# Patient Record
Sex: FEMALE | Race: BLACK OR AFRICAN AMERICAN | NOT HISPANIC OR LATINO | Employment: FULL TIME | URBAN - METROPOLITAN AREA
[De-identification: names, ages, dates, MRNs, and addresses within clinical notes are randomized per-mention and may not be internally consistent; named-entity substitution may affect disease eponyms.]

---

## 2017-04-30 ENCOUNTER — HOSPITAL ENCOUNTER (EMERGENCY)
Facility: HOSPITAL | Age: 47
Discharge: LEFT BEFORE DISCHARGE | End: 2017-04-30
Attending: EMERGENCY MEDICINE
Payer: MEDICAID

## 2017-04-30 VITALS
HEART RATE: 50 BPM | TEMPERATURE: 99 F | SYSTOLIC BLOOD PRESSURE: 164 MMHG | HEIGHT: 65 IN | RESPIRATION RATE: 16 BRPM | BODY MASS INDEX: 37.82 KG/M2 | OXYGEN SATURATION: 99 % | WEIGHT: 227 LBS | DIASTOLIC BLOOD PRESSURE: 75 MMHG

## 2017-04-30 DIAGNOSIS — E03.9 HYPOTHYROIDISM, UNSPECIFIED TYPE: Primary | ICD-10-CM

## 2017-04-30 LAB
ALBUMIN SERPL BCP-MCNC: 3.8 G/DL
ALP SERPL-CCNC: 63 U/L
ALT SERPL W/O P-5'-P-CCNC: 11 U/L
ANION GAP SERPL CALC-SCNC: 8 MMOL/L
ANION GAP SERPL CALC-SCNC: 9 MMOL/L
AST SERPL-CCNC: 14 U/L
BASOPHILS # BLD AUTO: 0 K/UL
BASOPHILS NFR BLD: 0.3 %
BILIRUB SERPL-MCNC: 0.4 MG/DL
BUN SERPL-MCNC: 12 MG/DL
BUN SERPL-MCNC: 13 MG/DL
CALCIUM SERPL-MCNC: 9.4 MG/DL
CALCIUM SERPL-MCNC: 9.6 MG/DL
CHLORIDE SERPL-SCNC: 101 MMOL/L
CHLORIDE SERPL-SCNC: 104 MMOL/L
CO2 SERPL-SCNC: 26 MMOL/L
CO2 SERPL-SCNC: 26 MMOL/L
CREAT SERPL-MCNC: 0.9 MG/DL
CREAT SERPL-MCNC: 0.9 MG/DL
DIFFERENTIAL METHOD: ABNORMAL
EOSINOPHIL # BLD AUTO: 0.1 K/UL
EOSINOPHIL NFR BLD: 0.9 %
ERYTHROCYTE [DISTWIDTH] IN BLOOD BY AUTOMATED COUNT: 16.5 %
EST. GFR  (AFRICAN AMERICAN): >60 ML/MIN/1.73 M^2
EST. GFR  (AFRICAN AMERICAN): >60 ML/MIN/1.73 M^2
EST. GFR  (NON AFRICAN AMERICAN): >60 ML/MIN/1.73 M^2
EST. GFR  (NON AFRICAN AMERICAN): >60 ML/MIN/1.73 M^2
GLUCOSE SERPL-MCNC: 79 MG/DL
GLUCOSE SERPL-MCNC: 87 MG/DL
HCT VFR BLD AUTO: 39.5 %
HGB BLD-MCNC: 12.6 G/DL
LYMPHOCYTES # BLD AUTO: 1.5 K/UL
LYMPHOCYTES NFR BLD: 23.3 %
MCH RBC QN AUTO: 26.9 PG
MCHC RBC AUTO-ENTMCNC: 32 %
MCV RBC AUTO: 84 FL
MONOCYTES # BLD AUTO: 0.4 K/UL
MONOCYTES NFR BLD: 7.1 %
NEUTROPHILS # BLD AUTO: 4.3 K/UL
NEUTROPHILS NFR BLD: 68.4 %
PLATELET # BLD AUTO: 197 K/UL
PMV BLD AUTO: 9.4 FL
POTASSIUM SERPL-SCNC: 5.1 MMOL/L
POTASSIUM SERPL-SCNC: 5.6 MMOL/L
PROT SERPL-MCNC: 8.4 G/DL
RBC # BLD AUTO: 4.7 M/UL
SODIUM SERPL-SCNC: 135 MMOL/L
SODIUM SERPL-SCNC: 139 MMOL/L
T4 FREE SERPL-MCNC: <0.4 NG/DL
TSH SERPL DL<=0.005 MIU/L-ACNC: 47.32 UIU/ML
WBC # BLD AUTO: 6.3 K/UL

## 2017-04-30 PROCEDURE — 85025 COMPLETE CBC W/AUTO DIFF WBC: CPT

## 2017-04-30 PROCEDURE — 84439 ASSAY OF FREE THYROXINE: CPT

## 2017-04-30 PROCEDURE — 99283 EMERGENCY DEPT VISIT LOW MDM: CPT

## 2017-04-30 PROCEDURE — 80053 COMPREHEN METABOLIC PANEL: CPT

## 2017-04-30 PROCEDURE — 36415 COLL VENOUS BLD VENIPUNCTURE: CPT

## 2017-04-30 PROCEDURE — 84443 ASSAY THYROID STIM HORMONE: CPT

## 2017-04-30 PROCEDURE — 80048 BASIC METABOLIC PNL TOTAL CA: CPT

## 2017-04-30 RX ORDER — LEVOTHYROXINE SODIUM 125 UG/1
125 TABLET ORAL DAILY
COMMUNITY
End: 2017-04-30

## 2017-04-30 RX ORDER — LEVOTHYROXINE SODIUM 125 UG/1
125 TABLET ORAL DAILY
Qty: 30 TABLET | Refills: 0 | Status: SHIPPED | OUTPATIENT
Start: 2017-04-30 | End: 2020-08-06 | Stop reason: CLARIF

## 2017-04-30 NOTE — DISCHARGE INSTRUCTIONS
Evaluating Thyroid Problems     Fine needle aspiration.   Your doctor suspects that you have a thyroid problem. Thyroid problems can be fairly easy to diagnose. Your doctor is likely to take a medical history, do a physical exam, and order blood tests. You may also have further testing. Based on the results, your doctor may refer you to an endocrinologist (thyroid specialist) or a surgeon.  Medical history  Your doctor will ask about symptoms youve noticed, such as changes in body temperature, weight, and energy level. Tell your doctor about all medications youre taking and if youve ever had thyroid surgery. Be sure to mention if you have a family history of thyroid problems, or if you are pregnant or plan to become pregnant. Also tell your doctor if youve ever been treated with radiation to the head or neck.  Physical exam  After the medical history, your doctor will examine you. He or she will feel your neck to check your thyroid gland for changes in size or shape. Your doctor may also look for changes in heart rate, reflexes, muscle strength, or skin texture.  Blood tests  Your doctor will order blood tests. They may include the following:  · A TSH test helps determine how much thyroid stimulating hormone (TSH) is being produced by the pituitary gland. This test is used to help diagnose or evaluate most thyroid problems, by determining whether a person's thyroid hormone is normal, overactive (hyperthyroidism), or underactive (hypothyroidism).  · T3 and T4 tests help determine how much thyroid hormones (T3 and T4) are available in the blood. This test if often used to help diagnose hyperthyroidism or hypothyroidism.  Other diagnostic tests  Based on the results of your exam and blood tests, you may have other diagnostic tests. They may include the following:  · Thyroid antibody tests are blood tests that look for problems with the immune system. These tests are most often used if hypothyroidism or  hyperthyroidism is suspected.  · An ultrasound uses sound waves to create an image showing the size and shape of the thyroid gland. It is most often used if a nodule (a lump in the thyroid) or goiter (enlarged thyroid) is suspected.  · A radioactive iodine uptake test measures how much iodine the thyroid gland takes in. It is most often used if hyperthyroidism is suspected.  · A thyroid scan is an imaging test that can show if part of the thyroid gland is making too much thyroid hormone. It is most often used if hyperthyroidism is suspected.  Fine needle aspiration (FNA)  If you have a nodule, you may have a fine needle aspiration done. This is a biopsy, which is a procedure to remove a sample of cells. An FNA is the best test to find out if thyroid cells are cancerous. The doctor uses a needle to take cells from the thyroid. The cells are then analyzed under a microscope. If cancer is suspected, other tests may also be done to help determine the type of cancer.  Risks and complications of FNA are rare, but include mild discomfort, bleeding, and skin infection.  Date Last Reviewed: 8/13/2014 © 2000-2016 [employer]. [address] [place] [address]. All rights reserved. This information is not intended as a substitute for professional medical care. Always follow your healthcare professional's instructions.          Treating Thyroid Problems    Your doctor has diagnosed a thyroid problem and will work with you to create a treatment plan. Even if you dont have symptoms, getting proper care is important. Following are the most common types of thyroid disorders and their treatments.  Hypothyroidism  Hypothyroidism is an underactive thyroid. There is no cure for this condition. But treatment can relieve most or all of your symptoms caused by the low thyroid hormone levels. Treatment for hypothyroidism involves taking thyroid hormone pills daily.  · Thyroid hormone pills replace the hormone your  thyroid doesnt make. You will likely need to take a daily pill for the rest of your life. Over time, your dosage may be adjusted. The medication has minimal side effects if the dosage is correct. However, if the dosage is too high, you may have symptoms of an overactive thyroid (such as nervousness, irritability, heart racing, tremors, difficulty sleeping, or brittle hair). If the dosage is too low, you may have symptoms of an underactive thyroid (such as dry skin, fatigue, loss of energy, or memory problems). Be sure to tell your health care provider if you notice any symptoms of thyroid problems.  · Your doctor will adjust your dose to achieve the right hormone levels. To be sure the thyroid hormone works properly, take the thyroid hormone pill on an empty stomach without other medications.  Hyperthyroidism  Hyperthyroidism is an overactive thyroid. The main treatments for hyperthyroidism may be used alone or in combination with beta-blockers (drugs that can reduce symptoms caused by too much thyroid hormone).  · Anti-thyroid medication can reduce the amount of thyroid hormone made by your thyroid gland. Reactions from this medication are rare, but may include a dangerous decline in white blood cells, and liver damage. Talk with your doctor for more information. If you have a fever or sore throat while taking this medicine, talk with your doctor right away.  · Radioactive iodine ablation is the most common treatment for hyperthyroidism. It involves taking a pill or liquid dose of radioactive iodine. This treatment destroys the thyroid cells that are making too much hormone. Radioiodine ablation may result in the need for daily thyroid hormone pills.  · Surgery can be an effective treatment for hyperthyroidism. It involves removing part or all of your thyroid gland. After surgery, you may need to take daily thyroid hormone pills.  In some instances, excessive thyroid hormone due to certain conditions can be  controlled by beta-blockers alone.  Nodules  If you have benign nodules, you may not need treatment right away. Instead, your health care provider may suggest regular exams and ultrasound tests to see if the nodules grow. If treatment is needed, it may include:  · Surgery to treat cancerous nodules or benign nodules that are causing symptoms (such as choking, or difficulty swallowing). Surgery involves removing part or all of your thyroid gland. Sometimes surgery to remove cancerous nodules may be followed by radioactive iodine therapy (radioiodine ablation). Afterward, you may need to take daily thyroid hormone pills.  · If a benign thyroid nodule is causing an overactive thyroid, your health care provider may treat it with anti-thyroid medications.  If these treatments do not work, then radioiodine ablation or surgery may be needed.  Radioiodine Ablation: Things to Know  This is a very safe treatment. Your health care provider will talk with you about any risks and possible complications. You will likely receive the iodine at the hospital and go home the same day. The risk from the radiation to yourself and others is very small. However, you may need to stay away from other people for several days. It is most important to avoid children and pregnant women during this time.   Date Last Reviewed: 8/13/2014  © 7780-2743 [employer]. [address] [place] [address], [place] [address]. All rights reserved. This information is not intended as a substitute for professional medical care. Always follow your healthcare professional's instructions.

## 2017-04-30 NOTE — Clinical Note
"Date: 4/30/2017  Patient: Serene Davis  Admitted: 4/30/2017  8:33 AM  Attending Provider: Sourav Baker MD    Serene Davis or her authorized caregiver has made the decision for the patient to leave the emergency department against the  advice of her attending physician. She or her authorized caregiver has been informed and understands the inherent risks, including death, disability, deterioration.  She or her authorized caregiver has decided to accept the responsibility for this decisi on. Serene Davis and all necessary parties have been advised that she may return for further evaluation or treatment. Her condition at time of discharge was fair.  Serene Davis had current vital signs as follows:  /85 (BP Location : Right arm, Patient Position: Sitting)  Pulse 64  Temp 98.7 °F (37.1 °C) (Oral)  Resp 16  Ht 5' 5" (1.651 m)  Wt 103 kg (227 lb)  SpO2 100%  BMI 37.77 kg/m2  "

## 2017-04-30 NOTE — ED AVS SNAPSHOT
[facility] [address] [place] [address]               Serene Davis   2017  8:33 AM   ED    Description:  Female : 1970   Department:  [facility]           Your Care was Coordinated By:     Provider Role From To    Sourav Baker MD Attending Provider 17 [phone] --      Reason for Visit     Medication Refill           Diagnoses this Visit        Comments    Hypothyroidism, unspecified type    -  Primary       ED Disposition     None           To Do List           Follow-up Information     Follow up with Laura Bell MD. Schedule an appointment as soon as possible for a visit in 2 days.    Specialty:  Family Medicine    Contact information:    [address]  [facility] [address]  [phone]          Follow up with [facility].    Specialty:  Emergency Medicine    Why:  If symptoms worsen    Contact information:    [address] [place] [id number] [phone]       These Medications        Disp Refills Start End    levothyroxine (SYNTHROID) 125 MCG tablet 30 tablet 0 2017     Take 1 tablet (125 mcg total) by mouth once daily. - Oral      [facility]     [facility] On Call Nurse Care Line -  Assistance  Unless otherwise directed by your provider, please contact [facility], our nurse care line that is available for  assistance.     Registered nurses in the [facility] provide: appointment scheduling, clinical advisement, health education, and other advisory services.  Call: [phone] (toll free)               Medications           Message regarding Medications     Verify the changes and/or additions to your medication regime listed below are the same as discussed with your clinician today.  If any of these changes or additions are incorrect, please notify your healthcare provider.        START taking these NEW medications        Refills     "levothyroxine (SYNTHROID) 125 MCG tablet 0    Sig: Take 1 tablet (125 mcg total) by mouth once daily.    Class: Print    Route: Oral           Verify that the below list of medications is an accurate representation of the medications you are currently taking.  If none reported, the list may be blank. If incorrect, please contact your healthcare provider. Carry this list with you in case of emergency.           Current Medications     levothyroxine (SYNTHROID) 125 MCG tablet Take 1 tablet (125 mcg total) by mouth once daily.           Clinical Reference Information           Your Vitals Were     BP Pulse Temp Resp Height Weight    138/85 (BP Location: Right arm, Patient Position: Sitting) 64 98.7 °F (37.1 °C) (Oral) 16 5' 5" (1.651 m) 103 kg (227 lb)    SpO2 BMI             100% 37.77 kg/m2         Allergies as of 4/30/2017        Reactions    Adhesive       Immunizations Administered on Date of Encounter - 4/30/2017     None      ED Micro, Lab, POCT     Start Ordered       Status Ordering Provider    04/30/17 1037 04/30/17 1037  Basic metabolic panel  Once      In process     04/30/17 1024 04/30/17 1023    STAT,   Status:  Canceled      Canceled     04/30/17 0917 04/30/17 0916  TSH  STAT      Final result     04/30/17 0917 04/30/17 0916  Complete Blood Count (CBC)  STAT      Final result     04/30/17 0917 04/30/17 0916  Comprehensive Metabolic Panel (CMP)  STAT      Final result     04/30/17 0916 04/30/17 0916  T4, free  Once      In process       ED Imaging Orders     None        Discharge Instructions         Evaluating Thyroid Problems     Fine needle aspiration.   Your doctor suspects that you have a thyroid problem. Thyroid problems can be fairly easy to diagnose. Your doctor is likely to take a medical history, do a physical exam, and order blood tests. You may also have further testing. Based on the results, your doctor may refer you to an endocrinologist (thyroid specialist) or a surgeon.  Medical " history  Your doctor will ask about symptoms youve noticed, such as changes in body temperature, weight, and energy level. Tell your doctor about all medications youre taking and if youve ever had thyroid surgery. Be sure to mention if you have a family history of thyroid problems, or if you are pregnant or plan to become pregnant. Also tell your doctor if youve ever been treated with radiation to the head or neck.  Physical exam  After the medical history, your doctor will examine you. He or she will feel your neck to check your thyroid gland for changes in size or shape. Your doctor may also look for changes in heart rate, reflexes, muscle strength, or skin texture.  Blood tests  Your doctor will order blood tests. They may include the following:  · A TSH test helps determine how much thyroid stimulating hormone (TSH) is being produced by the pituitary gland. This test is used to help diagnose or evaluate most thyroid problems, by determining whether a person's thyroid hormone is normal, overactive (hyperthyroidism), or underactive (hypothyroidism).  · T3 and T4 tests help determine how much thyroid hormones (T3 and T4) are available in the blood. This test if often used to help diagnose hyperthyroidism or hypothyroidism.  Other diagnostic tests  Based on the results of your exam and blood tests, you may have other diagnostic tests. They may include the following:  · Thyroid antibody tests are blood tests that look for problems with the immune system. These tests are most often used if hypothyroidism or hyperthyroidism is suspected.  · An ultrasound uses sound waves to create an image showing the size and shape of the thyroid gland. It is most often used if a nodule (a lump in the thyroid) or goiter (enlarged thyroid) is suspected.  · A radioactive iodine uptake test measures how much iodine the thyroid gland takes in. It is most often used if hyperthyroidism is suspected.  · A thyroid scan is an imaging test  that can show if part of the thyroid gland is making too much thyroid hormone. It is most often used if hyperthyroidism is suspected.  Fine needle aspiration (FNA)  If you have a nodule, you may have a fine needle aspiration done. This is a biopsy, which is a procedure to remove a sample of cells. An FNA is the best test to find out if thyroid cells are cancerous. The doctor uses a needle to take cells from the thyroid. The cells are then analyzed under a microscope. If cancer is suspected, other tests may also be done to help determine the type of cancer.  Risks and complications of FNA are rare, but include mild discomfort, bleeding, and skin infection.  Date Last Reviewed: 8/13/2014  © 7504-9477 [employer]. [address] [place] [address], [place] [address]. All rights reserved. This information is not intended as a substitute for professional medical care. Always follow your healthcare professional's instructions.          Treating Thyroid Problems    Your doctor has diagnosed a thyroid problem and will work with you to create a treatment plan. Even if you dont have symptoms, getting proper care is important. Following are the most common types of thyroid disorders and their treatments.  Hypothyroidism  Hypothyroidism is an underactive thyroid. There is no cure for this condition. But treatment can relieve most or all of your symptoms caused by the low thyroid hormone levels. Treatment for hypothyroidism involves taking thyroid hormone pills daily.  · Thyroid hormone pills replace the hormone your thyroid doesnt make. You will likely need to take a daily pill for the rest of your life. Over time, your dosage may be adjusted. The medication has minimal side effects if the dosage is correct. However, if the dosage is too high, you may have symptoms of an overactive thyroid (such as nervousness, irritability, heart racing, tremors, difficulty sleeping, or brittle hair). If the dosage is too low, you may  have symptoms of an underactive thyroid (such as dry skin, fatigue, loss of energy, or memory problems). Be sure to tell your health care provider if you notice any symptoms of thyroid problems.  · Your doctor will adjust your dose to achieve the right hormone levels. To be sure the thyroid hormone works properly, take the thyroid hormone pill on an empty stomach without other medications.  Hyperthyroidism  Hyperthyroidism is an overactive thyroid. The main treatments for hyperthyroidism may be used alone or in combination with beta-blockers (drugs that can reduce symptoms caused by too much thyroid hormone).  · Anti-thyroid medication can reduce the amount of thyroid hormone made by your thyroid gland. Reactions from this medication are rare, but may include a dangerous decline in white blood cells, and liver damage. Talk with your doctor for more information. If you have a fever or sore throat while taking this medicine, talk with your doctor right away.  · Radioactive iodine ablation is the most common treatment for hyperthyroidism. It involves taking a pill or liquid dose of radioactive iodine. This treatment destroys the thyroid cells that are making too much hormone. Radioiodine ablation may result in the need for daily thyroid hormone pills.  · Surgery can be an effective treatment for hyperthyroidism. It involves removing part or all of your thyroid gland. After surgery, you may need to take daily thyroid hormone pills.  In some instances, excessive thyroid hormone due to certain conditions can be controlled by beta-blockers alone.  Nodules  If you have benign nodules, you may not need treatment right away. Instead, your health care provider may suggest regular exams and ultrasound tests to see if the nodules grow. If treatment is needed, it may include:  · Surgery to treat cancerous nodules or benign nodules that are causing symptoms (such as choking, or difficulty swallowing). Surgery involves removing  part or all of your thyroid gland. Sometimes surgery to remove cancerous nodules may be followed by radioactive iodine therapy (radioiodine ablation). Afterward, you may need to take daily thyroid hormone pills.  · If a benign thyroid nodule is causing an overactive thyroid, your health care provider may treat it with anti-thyroid medications.  If these treatments do not work, then radioiodine ablation or surgery may be needed.  Radioiodine Ablation: Things to Know  This is a very safe treatment. Your health care provider will talk with you about any risks and possible complications. You will likely receive the iodine at the hospital and go home the same day. The risk from the radiation to yourself and others is very small. However, you may need to stay away from other people for several days. It is most important to avoid children and pregnant women during this time.   Date Last Reviewed: 8/13/2014  © 7898-3188 [employer]. [address] [place] [address]. All rights reserved. This information is not intended as a substitute for professional medical care. Always follow your healthcare professional's instructions.          [facility] Sign-Up     Activating your [facility] account is as easy as 1-2-3!     1) Visit [email or web address], select Sign Up Now, enter this activation code and your date of birth, then select Next.  [email or web address]  Expires: 6/14/2017 11:13 AM      2) Create a username and password to use when you visit [facility] in the future and select a security question in case you lose your password and select Next.    3) Enter your e-mail address and click Sign Up!    Additional Information  If you have questions, please e-mail [email or web address] or call [phone] to talk to our [facility] staff. Remember, [facility] is NOT to be used for urgent needs. For medical emergencies, dial 911.         Smoking Cessation     If you would like to quit smoking:   You may be eligible for free  services if you are a [place] resident and started smoking cigarettes before September 1, 1988.  Call the Smoking Cessation Trust (SCT) toll free at (217) 073-6981 or (832) 351-9780.   Call 1-800-QUIT-NOW if you do not meet the above criteria.   Contact us via email: [email or web address]   View our website for more information: [email or web address]         [facility] complies with applicable Federal civil rights laws and does not discriminate on the basis of race, color, national origin, age, disability, or sex.        Language Assistance Services     ATTENTION: Language assistance services are available, free of charge. Please call [phone].      ATENCIÓN: Si habla ciro, tiene a hadley disposición servicios gratuitos de asistencia lingüística. Llame al [phone].     CHÚ Ý: N?u b?n nói Ti?ng Vi?t, có các d?ch v? h? tr? ngôn ng? mi?n phí dành cho b?n. G?i s? [phone].

## 2017-04-30 NOTE — ED PROVIDER NOTES
Encounter Date: 4/30/2017    SCRIBE #1 NOTE: Enedina DAVIS am scribing for, and in the presence of, Dr. Baker.       History     Chief Complaint   Patient presents with    Medication Refill     has graves dz. has been out of medication x2 months.      Review of patient's allergies indicates:   Allergen Reactions    Adhesive      HPI Comments: 4/30/2017  9:12 AM     Chief Complaint: Medication refill    The patient is a 46 y.o. female with PMHx of graves disease who presents with medication refill. The patient states ran out of her thyroid medication, synthroid 8 weeks ago. She has been unable to see her PCP to get a refill due to her conflicting work schedule. She complains of mild swelling to the hands and legs bilaterally which started gradually 8 weeks ago. Pt reports she gain weight as well. No fever, cough, sore throat, sob or cp. Pt is In the process of looking for an endocrinologist. Shx of hysterectomy.    The history is provided by the patient.     Past Medical History:   Diagnosis Date    Graves disease      No past surgical history on file.  History reviewed. No pertinent family history.  Social History   Substance Use Topics    Smoking status: None    Smokeless tobacco: None    Alcohol use None     Review of Systems   Constitutional: Positive for unexpected weight change (gain weight). Negative for appetite change, chills and fever.        + Medication refill   HENT: Negative for congestion, rhinorrhea and sore throat.    Respiratory: Negative for cough and shortness of breath.    Cardiovascular: Negative for chest pain.   Gastrointestinal: Negative for abdominal pain, diarrhea, nausea and vomiting.   Genitourinary: Negative for dysuria.   Musculoskeletal: Positive for joint swelling (swelling of the hands and legs). Negative for back pain and myalgias.   Skin: Negative for rash.   Neurological: Negative for weakness and numbness.   Hematological: Does not bruise/bleed easily.   All other  systems reviewed and are negative.      Physical Exam   Initial Vitals   BP Pulse Resp Temp SpO2   04/30/17 0830 04/30/17 0830 04/30/17 0830 04/30/17 0830 04/30/17 0830   138/85 64 16 98.7 °F (37.1 °C) 100 %     Physical Exam    Nursing note and vitals reviewed.  Constitutional: She appears well-developed and well-nourished. No distress.   HENT:   Head: Normocephalic and atraumatic.   Mouth/Throat: Oropharynx is clear and moist and mucous membranes are normal.   Eyes: EOM are normal.   Pupils are 3 x 2 mm and reactive to light bilaterally.   Neck: Normal range of motion. Neck supple.   No palpable masses or abnormalities of the neck. Trachea midline.   Cardiovascular: Normal rate, regular rhythm, normal heart sounds, intact distal pulses and normal pulses. Exam reveals no gallop and no friction rub.    No murmur heard.  Pulses:       Radial pulses are 2+ on the right side, and 2+ on the left side.        Dorsalis pedis pulses are 2+ on the right side, and 2+ on the left side.        Posterior tibial pulses are 2+ on the right side, and 2+ on the left side.   Pulmonary/Chest: Breath sounds normal. She has no wheezes. She has no rhonchi. She has no rales.   Abdominal: Soft. Bowel sounds are normal. She exhibits no distension. There is no tenderness.   No palpable organomegaly. Negative CVA ttp bilaterally.   Musculoskeletal: Normal range of motion. She exhibits no edema or tenderness.   No step-off or bony deformity of the spine. No edema to the extremities.   Lymphadenopathy:     She has no cervical adenopathy.   Neurological: She is alert and oriented to person, place, and time.   Skin: Skin is dry. No rash noted.   Capillary refill less than 2 seconds.   Psychiatric: She has a normal mood and affect.         ED Course   Procedures  Labs Reviewed   TSH - Abnormal; Notable for the following:        Result Value    TSH 47.323 (*)     All other components within normal limits   CBC W/ AUTO DIFFERENTIAL - Abnormal;  Notable for the following:     MCH 26.9 (*)     RDW 16.5 (*)     All other components within normal limits   COMPREHENSIVE METABOLIC PANEL - Abnormal; Notable for the following:     Potassium 5.6 (*)     All other components within normal limits   BASIC METABOLIC PANEL - Abnormal; Notable for the following:     Sodium 135 (*)     All other components within normal limits   T4, FREE - Abnormal; Notable for the following:     Free T4 <0.40 (*)     All other components within normal limits             Medical Decision Making:   History:   Old Medical Records: I decided to obtain old medical records.  Initial Assessment:   This is an urgent evaluation of 46 y.o. female who has been without synthroid for 8 weeks. Pt reports weight gain and subjective swelling in the hands. No PE findings consistent with this. Will check thyroid level and likely refill synthroid. Pt is advised to f/u with pcp in the next 2-3 days.  Clinical Tests:   Lab Tests: Ordered and Reviewed            Scribe Attestation:   Scribe #1: I performed the above scribed service and the documentation accurately describes the services I performed. I attest to the accuracy of the note.    Attending Attestation:           Physician Attestation for Scribe:  Physician Attestation Statement for Scribe #1: I, Dr. Baker , reviewed documentation, as scribed by Enedina Riggins in my presence, and it is both accurate and complete.         Attending ED Notes:   Patient's TSH is elevated and potassium is 5.6 however sample has possibly hemolyzed, I am repeating patient's potassium after having her hydrate and checking free T4 however patient reports she needs to leave to  her [place] the airport.  Patient wants to sign out of the hospital AGAINST MEDICAL ADVICE.  Patient is alert and oriented ×4 she showsclinical signs of intoxication, patient advised that there is a risk of death, permanent disability and rapid deterioration.  Patient is advised to follow-up  with PCP as soon as possible and to return to the ER if she changes her mind about leaving or for for any worsening or any further concerns, she is strongly advised not to leave however patient does not wish to stay.     Addendum: Patient changed her mind about signing out of the hospital AGAINST MEDICAL ADVICE and has decided to stay, repeat potassium shows potassium of 5.1 down from 5.7.  Patient's TSH is elevated and free T4 is low patient will be started on Synthroid as previously prescribed by her historical provider she is to follow-up with PCP in the next 2-3 days for reevaluation and cautioned to return immediately to the ER for any worsening or any further concerns.          ED Course     Clinical Impression:   The encounter diagnosis was Hypothyroidism, unspecified type.          Sourav Baker MD  04/30/17 1782

## 2020-08-06 ENCOUNTER — HOSPITAL ENCOUNTER (EMERGENCY)
Facility: HOSPITAL | Age: 50
Discharge: HOME | End: 2020-08-06
Attending: EMERGENCY MEDICINE
Payer: MEDICAID

## 2020-08-06 VITALS
OXYGEN SATURATION: 100 % | HEART RATE: 71 BPM | RESPIRATION RATE: 16 BRPM | SYSTOLIC BLOOD PRESSURE: 144 MMHG | WEIGHT: 227.06 LBS | DIASTOLIC BLOOD PRESSURE: 71 MMHG | TEMPERATURE: 98 F | HEIGHT: 65 IN | BODY MASS INDEX: 37.83 KG/M2

## 2020-08-06 DIAGNOSIS — M19.012 ARTHRITIS OF LEFT SHOULDER REGION: ICD-10-CM

## 2020-08-06 DIAGNOSIS — G89.29 CHRONIC LEFT SHOULDER PAIN: Primary | ICD-10-CM

## 2020-08-06 DIAGNOSIS — M25.512 CHRONIC LEFT SHOULDER PAIN: Primary | ICD-10-CM

## 2020-08-06 PROCEDURE — 96372 THER/PROPH/DIAG INJ SC/IM: CPT

## 2020-08-06 PROCEDURE — 99284 EMERGENCY DEPT VISIT MOD MDM: CPT | Mod: 25

## 2020-08-06 PROCEDURE — 63600175 PHARM REV CODE 636 W HCPCS: Performed by: EMERGENCY MEDICINE

## 2020-08-06 RX ORDER — ETODOLAC 200 MG/1
200 CAPSULE ORAL 3 TIMES DAILY
Qty: 21 CAPSULE | Refills: 0 | Status: SHIPPED | OUTPATIENT
Start: 2020-08-06 | End: 2020-08-09

## 2020-08-06 RX ORDER — LEVOTHYROXINE SODIUM 100 UG/1
100 TABLET ORAL
COMMUNITY

## 2020-08-06 RX ORDER — METHOCARBAMOL 500 MG/1
1000 TABLET, FILM COATED ORAL 3 TIMES DAILY PRN
Qty: 30 TABLET | Refills: 0 | Status: SHIPPED | OUTPATIENT
Start: 2020-08-06 | End: 2020-08-11

## 2020-08-06 RX ORDER — KETOROLAC TROMETHAMINE 30 MG/ML
30 INJECTION, SOLUTION INTRAMUSCULAR; INTRAVENOUS
Status: COMPLETED | OUTPATIENT
Start: 2020-08-06 | End: 2020-08-06

## 2020-08-06 RX ADMIN — KETOROLAC TROMETHAMINE 30 MG: 30 INJECTION, SOLUTION INTRAMUSCULAR at 01:08

## 2020-08-06 NOTE — ED PROVIDER NOTES
Encounter Date: 8/6/2020    SCRIBE #1 NOTE: Liseth DAVIS am scribing for, and in the presence of, Rigo Randolph MD.       History     Chief Complaint   Patient presents with    Shoulder Pain     left shoulder x 3 months with progressiuve worsening; pain is extreme with movement       Time seen by provider: 1:16 AM on 08/06/2020    Serene Davis is a 50 y.o. female with a PMHx of Graves disease who presents to the ED with an onset of worsening left shoulder pain for 3 months. Patient suspects a rotator cuff injury because it feels similar to previous right rotator cuff injury. Patient endorses ROM is limited secondary to pain. The patient denies any other symptoms at this time. No pertinent PSHx.      The history is provided by the patient.     Review of patient's allergies indicates:   Allergen Reactions    Adhesive      Past Medical History:   Diagnosis Date    Graves disease      No past surgical history on file.  No family history on file.  Social History     Tobacco Use    Smoking status: Not on file   Substance Use Topics    Alcohol use: Not on file    Drug use: Not on file     Review of Systems   Constitutional: Negative for fever.   HENT: Negative for congestion.    Eyes: Negative for visual disturbance.   Respiratory: Negative for wheezing.    Cardiovascular: Negative for chest pain.   Gastrointestinal: Negative for abdominal pain.   Genitourinary: Negative for dysuria.   Musculoskeletal: Positive for arthralgias. Negative for joint swelling.   Skin: Negative for rash.   Neurological: Negative for syncope.   Hematological: Does not bruise/bleed easily.   Psychiatric/Behavioral: Negative for confusion.       Physical Exam     Initial Vitals [08/06/20 0104]   BP Pulse Resp Temp SpO2   (!) 144/71 71 16 98.4 °F (36.9 °C) 100 %      MAP       --         Physical Exam    Nursing note and vitals reviewed.  Constitutional: She appears well-nourished.   HENT:   Head: Normocephalic and  atraumatic.   Eyes: Conjunctivae and EOM are normal.   Neck: Normal range of motion. Neck supple. No thyroid mass present.   Cardiovascular: Normal rate, regular rhythm and normal heart sounds. Exam reveals no gallop and no friction rub.    No murmur heard.  Pulmonary/Chest: Breath sounds normal. She has no wheezes. She has no rhonchi. She has no rales. She exhibits no tenderness.   Abdominal: Soft. Normal appearance and bowel sounds are normal. There is no abdominal tenderness.   Musculoskeletal:      Comments: Left bicep tendon intact. Marked inhibition in passive and active ROM secondary to pain localized to superior shoulder. No gross bony abnormalities. Neurovascularly intact LUE.   Neurological: She is alert and oriented to person, place, and time. She has normal strength. No cranial nerve deficit or sensory deficit.   Skin: Skin is warm and dry. No rash noted. No erythema.   Psychiatric: She has a normal mood and affect. Her speech is normal. Cognition and memory are normal.         ED Course   Procedures  Labs Reviewed - No data to display       Imaging Results    None          Medical Decision Making:   History:   Old Medical Records: I decided to obtain old medical records.  Independently Interpreted Test(s):   I have ordered and independently interpreted X-rays - see prior notes.  Clinical Tests:   Radiological Study: Ordered and Reviewed  ED Management:  Pt seen and examined emergently. VSS. No CP. Reporting nontraumatic chronic shoulder pain flair. provided symptomatic meds and education. XR indicates no acute trauma but note is made of early degenerative changes. Pt educated s/s may be associated with arthritis VS RC injury. She is asked to f/u with her orthopedist asap regarding further monitoring and treatment and asked to return to the ED immediately for any new, worsening or concerning symptoms. Pt agreeable and dc'd in stable condition.            Scribe Attestation:   Scribe #1: I performed the  above scribed service and the documentation accurately describes the services I performed. I attest to the accuracy of the note.    I, Dr. Rigo Randolph, personally performed the services described in this documentation. All medical record entries made by the scribe were at my direction and in my presence.  I have reviewed the chart and agree that the record reflects my personal performance and is accurate and complete. Rigo Randolph MD.  5:43 PM 08/09/2020                        Clinical Impression:       ICD-10-CM ICD-9-CM   1. Chronic left shoulder pain  M25.512 719.41    G89.29 338.29   2. Arthritis of left shoulder region  M19.012 716.91         Disposition:   Disposition: Discharged  Condition: Stable                        Rigo Randolph MD  08/09/20 1748

## 2021-03-10 ENCOUNTER — OCCUPATIONAL HEALTH (OUTPATIENT)
Dept: URGENT CARE | Facility: CLINIC | Age: 51
End: 2021-03-10

## 2021-03-10 PROCEDURE — 82075 CHG ASSAY OF BREATH ETHANOL: ICD-10-PCS | Mod: S$GLB,,, | Performed by: EMERGENCY MEDICINE

## 2021-03-10 PROCEDURE — 80305 DRUG TEST PRSMV DIR OPT OBS: CPT | Mod: S$GLB,,, | Performed by: EMERGENCY MEDICINE

## 2021-03-10 PROCEDURE — 82075 ASSAY OF BREATH ETHANOL: CPT | Mod: S$GLB,,, | Performed by: EMERGENCY MEDICINE

## 2021-03-10 PROCEDURE — 80305 PR NON-DOT DRUG SCREENS: ICD-10-PCS | Mod: S$GLB,,, | Performed by: EMERGENCY MEDICINE

## 2021-04-05 ENCOUNTER — IMMUNIZATION (OUTPATIENT)
Dept: FAMILY MEDICINE | Facility: CLINIC | Age: 51
End: 2021-04-05
Payer: MEDICAID

## 2021-04-05 DIAGNOSIS — Z23 NEED FOR VACCINATION: Primary | ICD-10-CM

## 2021-04-05 PROCEDURE — 0031A COVID-19,VECTOR-NR,RS-AD26,PF,0.5 ML DOSE VACCINE (JANSSEN): CPT | Mod: PBBFAC | Performed by: FAMILY MEDICINE

## 2022-09-20 ENCOUNTER — OFFICE VISIT (OUTPATIENT)
Dept: URGENT CARE | Facility: CLINIC | Age: 52
End: 2022-09-20
Payer: COMMERCIAL

## 2022-09-20 VITALS
TEMPERATURE: 98 F | RESPIRATION RATE: 16 BRPM | HEART RATE: 70 BPM | OXYGEN SATURATION: 99 % | DIASTOLIC BLOOD PRESSURE: 89 MMHG | SYSTOLIC BLOOD PRESSURE: 149 MMHG

## 2022-09-20 DIAGNOSIS — Z02.6 ENCOUNTER RELATED TO WORKER'S COMPENSATION CLAIM: Primary | ICD-10-CM

## 2022-09-20 DIAGNOSIS — M23.92 ACUTE INTERNAL DERANGEMENT OF LEFT KNEE: ICD-10-CM

## 2022-09-20 DIAGNOSIS — M25.462 EFFUSION OF LEFT KNEE JOINT: ICD-10-CM

## 2022-09-20 PROCEDURE — 96372 PR INJECTION,THERAP/PROPH/DIAG2ST, IM OR SUBCUT: ICD-10-PCS | Mod: S$GLB,,, | Performed by: FAMILY MEDICINE

## 2022-09-20 PROCEDURE — 96372 THER/PROPH/DIAG INJ SC/IM: CPT | Mod: S$GLB,,, | Performed by: FAMILY MEDICINE

## 2022-09-20 PROCEDURE — 99203 OFFICE O/P NEW LOW 30 MIN: CPT | Mod: 25,S$GLB,, | Performed by: FAMILY MEDICINE

## 2022-09-20 PROCEDURE — 99203 PR OFFICE/OUTPT VISIT, NEW, LEVL III, 30-44 MIN: ICD-10-PCS | Mod: 25,S$GLB,, | Performed by: FAMILY MEDICINE

## 2022-09-20 RX ORDER — ETODOLAC 300 MG/1
300 CAPSULE ORAL 2 TIMES DAILY
Qty: 30 CAPSULE | Refills: 0 | Status: SHIPPED | OUTPATIENT
Start: 2022-09-20

## 2022-09-20 RX ORDER — CYCLOBENZAPRINE HCL 5 MG
5 TABLET ORAL NIGHTLY
Qty: 15 TABLET | Refills: 0 | Status: SHIPPED | OUTPATIENT
Start: 2022-09-20 | End: 2022-09-30

## 2022-09-20 RX ORDER — KETOROLAC TROMETHAMINE 30 MG/ML
30 INJECTION, SOLUTION INTRAMUSCULAR; INTRAVENOUS
Status: COMPLETED | OUTPATIENT
Start: 2022-09-20 | End: 2022-09-20

## 2022-09-20 RX ADMIN — KETOROLAC TROMETHAMINE 30 MG: 30 INJECTION, SOLUTION INTRAMUSCULAR; INTRAVENOUS at 10:09

## 2022-09-20 NOTE — LETTER
[facility]  [address], [address]  [facility] [address]  Phone: [phone]  Fax: [phone]  [employer]: [email or web address]    Pt Name: Serene Davis  Injury Date: 09/11/2022   Employee ID: [id number] Date of First Treatment: 09/20/2022   Company: [employer]      Appointment Time: 09:45 AM Arrived: 945   Provider: Hermann Samayoa MD Time Out:1045     Office Treatment:   1. Encounter related to worker's compensation claim    2. Effusion of left knee joint    3. Acute internal derangement of left knee      Medications Ordered This Encounter   Medications    cyclobenzaprine (FLEXERIL) 5 MG tablet    etodolac (LODINE) 300 MG Cap    ketorolac injection 30 mg      Patient Instructions: Attention not to aggravate affected area, Apply ice 24-48 hours then apply heat/warm soaks, Daily home exercises/warm soaks   Some OTC measures to help in recovery(if no allergies to, renal issues or pregnant):  Tylenol 325mg 3x per day  Take Pepcid 20mg BID  If applicable or discussed: Magnesium OTC daily; Topical Voltaren Gel; Lidocaine patches  Massage area if possible  Resting of the injured area  Ice for ankle, knee, wrist or elbow injury  Elevation of the injured area if applicable  Heating pad for muscle injury  Stretching/ROM exercises as described in clinic.      Restrictions: Discharged to Ortho/Neuro/Opthomologist/Surgeon, Disabled until next office visit     Return Appointment: to f/u with ortho

## 2022-09-20 NOTE — PROGRESS NOTES
Subjective:       Patient ID: Serene Davis is a 52 y.o. female.    Chief Complaint: Knee Pain    Pt presents ot urgent care for a w/c initial visit. She works for the [employer].  She works at the [employer].  She states that she was in the room helping a client, she noticed that he was sitting in a chair.  He rocked to get up, to keep him from falling she bent down and heard and her left knee pop.  She was seen at [facility] on 9/11/2022. They did x rays on her left knee.  Pain scale today- 8. DOI 9/11/2022.  She states that the pain is always there in the morning.  She is taking ibuprofen and tylenol for the pain.  She was given a knee brace at the hospital. She is not sleeping very well at night because of the pain.       9/11/2022- visit at ER- Patient is a 52-year-old female with complaint of left knee pain present for the last week.  Patient was helping to move of person and felt a pop in her left knee.  Patient has had continuous pain since the injury.  No weakness or numbness.  Patient does have an orthopedist in [place].  No fevers.  No calf pain.  No history of DVT.    Knee Pain   The incident occurred 5 to 7 days ago. The incident occurred at work. There was no injury mechanism. The pain is present in the right knee. The quality of the pain is described as shooting and stabbing. The pain is at a severity of 8/10. The pain is moderate. The pain has been Intermittent since onset. She reports no foreign bodies present. Nothing aggravates the symptoms. She has tried NSAIDs and acetaminophen for the symptoms. The treatment provided mild relief.   ROS     Objective:      Physical Exam  Musculoskeletal:         General: Swelling and tenderness present.      Left knee: Swelling and effusion present. Decreased range of motion. Tenderness present over the medial joint line and lateral joint line.      Instability Tests: Anterior drawer test negative. Anterior Lachman test negative. Medial  Maite test positive and lateral Maite test positive.        Legs:        Assessment:       1. Encounter related to worker's compensation claim    2. Effusion of left knee joint    3. Acute internal derangement of left knee        Plan:     Pt would like referral to ortho    Medications Ordered This Encounter   Medications    cyclobenzaprine (FLEXERIL) 5 MG tablet     Sig: Take 1 tablet (5 mg total) by mouth nightly. for 10 days     Dispense:  15 tablet     Refill:  0    etodolac (LODINE) 300 MG Cap     Sig: Take 1 capsule (300 mg total) by mouth 2 (two) times daily.     Dispense:  30 capsule     Refill:  0    ketorolac injection 30 mg     Patient Instructions: Attention not to aggravate affected area, Apply ice 24-48 hours then apply heat/warm soaks, Daily home exercises/warm soaks   Restrictions: Discharged to Ortho/Neuro/Opthomologist/Surgeon, Disabled until next office visit  No follow-ups on file.      X-Ray Knee 1 or 2 View Left    Result Date: 9/11/2022  EXAMINATION: XR KNEE 1 OR 2 VIEW LEFT CLINICAL HISTORY: Knee pain; heard a pop. TECHNIQUE: Four views of the left knee. COMPARISON: None FINDINGS: No acute fracture.  No dislocation or subluxation.  Mild medial compartment joint space narrowing.  No significant suprapatellar joint effusion.  Quadriceps insertion enthesophyte is noted.  No radiopaque foreign body or gross soft tissue abnormality.     No acute fracture or dislocation. Electronically signed by: Casimiro Mayorga MD Date:    09/11/2022 Time:    09:41

## 2022-09-23 ENCOUNTER — OFFICE VISIT (OUTPATIENT)
Dept: ORTHOPEDICS | Facility: CLINIC | Age: 52
End: 2022-09-23
Payer: COMMERCIAL

## 2022-09-23 ENCOUNTER — HOSPITAL ENCOUNTER (OUTPATIENT)
Dept: RADIOLOGY | Facility: HOSPITAL | Age: 52
Discharge: HOME | End: 2022-09-23
Attending: ORTHOPAEDIC SURGERY
Payer: COMMERCIAL

## 2022-09-23 VITALS — WEIGHT: 242 LBS | BODY MASS INDEX: 40.32 KG/M2 | RESPIRATION RATE: 18 BRPM | HEIGHT: 65 IN

## 2022-09-23 DIAGNOSIS — M17.10 ARTHRITIS OF KNEE: ICD-10-CM

## 2022-09-23 DIAGNOSIS — M25.562 ACUTE PAIN OF LEFT KNEE: Primary | ICD-10-CM

## 2022-09-23 DIAGNOSIS — M25.562 ACUTE PAIN OF LEFT KNEE: ICD-10-CM

## 2022-09-23 DIAGNOSIS — S83.104A ACUTE TRAUMATIC INTERNAL DERANGEMENT OF RIGHT KNEE, INITIAL ENCOUNTER: Primary | ICD-10-CM

## 2022-09-23 DIAGNOSIS — M22.42 CHONDROMALACIA OF LEFT PATELLA: ICD-10-CM

## 2022-09-23 PROCEDURE — 73564 XR KNEE ORTHO LEFT WITH FLEXION: ICD-10-PCS | Mod: 26,LT,, | Performed by: RADIOLOGY

## 2022-09-23 PROCEDURE — 99203 PR OFFICE/OUTPT VISIT, NEW, LEVL III, 30-44 MIN: ICD-10-PCS | Mod: S$GLB,,, | Performed by: ORTHOPAEDIC SURGERY

## 2022-09-23 PROCEDURE — 99999 PR PBB SHADOW E&M-EST. PATIENT-LVL III: ICD-10-PCS | Mod: PBBFAC,,, | Performed by: ORTHOPAEDIC SURGERY

## 2022-09-23 PROCEDURE — 73564 X-RAY EXAM KNEE 4 OR MORE: CPT | Mod: 26,LT,, | Performed by: RADIOLOGY

## 2022-09-23 PROCEDURE — 99203 OFFICE O/P NEW LOW 30 MIN: CPT | Mod: S$GLB,,, | Performed by: ORTHOPAEDIC SURGERY

## 2022-09-23 PROCEDURE — 73562 XR KNEE ORTHO LEFT WITH FLEXION: ICD-10-PCS | Mod: 26,RT,, | Performed by: RADIOLOGY

## 2022-09-23 PROCEDURE — 73562 X-RAY EXAM OF KNEE 3: CPT | Mod: 26,RT,, | Performed by: RADIOLOGY

## 2022-09-23 PROCEDURE — 73562 X-RAY EXAM OF KNEE 3: CPT | Mod: TC,PN,RT

## 2022-09-23 PROCEDURE — 99999 PR PBB SHADOW E&M-EST. PATIENT-LVL III: CPT | Mod: PBBFAC,,, | Performed by: ORTHOPAEDIC SURGERY

## 2022-09-23 NOTE — PROGRESS NOTES
Patient ID: Serene Davis is a 52 y.o. female    Chief Complaint:   Chief Complaint   Patient presents with    Left Knee - Pain       History of Present Illness:    Serene Davis is a pleasant 52 y.o. female who presents for evaluation of left knee pain. She  reports pain for the past 2 weeks. She does endorse a history of trauma.  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  Bending and straightening makes it worse and rest makes it better. The pain is mostly in front of the knee and occasionally lateral. She does endorse nighttime pain.   She is independent with all activities of daily living.     In the past she has tried the following treatments:    NSAIDS     She currently does work.   Occupation:  Works at a assisted living facility    Instability: No  Mechanical Symptoms: Yes    Diabetic:  No  Smoker:  No          PAST MEDICAL HISTORY:   Past Medical History:   Diagnosis Date    Graves disease      PAST SURGICAL HISTORY: No past surgical history on file.  FAMILY HISTORY: No family history on file.  SOCIAL HISTORY:   Social History     Occupational History    Not on file   Tobacco Use    Smoking status: Not on file    Smokeless tobacco: Not on file   Substance and Sexual Activity    Alcohol use: Not on file    Drug use: Not on file    Sexual activity: Not on file        MEDICATIONS:   Current Outpatient Medications:     cyclobenzaprine (FLEXERIL) 5 MG tablet, Take 1 tablet (5 mg total) by mouth nightly. for 10 days, Disp: 15 tablet, Rfl: 0    etodolac (LODINE) 300 MG Cap, Take 1 capsule (300 mg total) by mouth 2 (two) times daily., Disp: 30 capsule, Rfl: 0    gabapentin (NEURONTIN) 800 MG tablet, Take 800 mg by mouth once daily., Disp: , Rfl:     levothyroxine (SYNTHROID) 100 MCG tablet, Take 100 mcg by mouth before breakfast., Disp: , Rfl:   ALLERGIES:   Review of patient's allergies indicates:   Allergen  Reactions    Adhesive          Physical Exam     Vitals:    22 1021   Resp: 18     Alert and oriented to person, place and time. No acute distress. Well-groomed, not ill appearing. Pupils round and reactive, normal respiratory effort, no audible wheezing.     GENERAL:  A well-developed, well-nourished 52 y.o. female who is alert and       oriented in no acute distress.      Gait: She  walks with a antalgic gait.                   EXTREMITIES:  Examination of lower extremities reveals there is no visible mass or deformity.    Left knee:  ROM     Ligamentously stable to varus/valgus stress.    Anterior and posterior drawers negative.    No pain over pes bursa.    No warmth    No erythema     Effusion Yes    lateral joint line tenderness    Positive Patellofemoral grind/crepitus         The skin over both lower extremities is normal and unremarkable.  She has a  painless range of motion of the hips and ankles bilaterally.   Sensation is intact in both lower extremities.    There are no motor deficits in the lower extremities bilaterally.   Pedal pulses are palpable distally bilaterally.    She has no calf tenderness to palpation nor edema.         Imagin view  standing left knee X-rays ordered/reviewed by me showing no evidence of fracture or dislocation. There is no obvious malalignment. No evidence of masses, lesions or foreign bodies.  Mild to moderate degenerative changes, worse on the right knee.  Bone spur on the superior pole patella.        Assessment & Plan    Acute traumatic internal derangement of right knee, initial encounter  -     MRI Knee Without Contrast Left; Future; Expected date: 2022    Arthritis of knee    Chondromalacia of left patella    Acute pain of left knee         Treatment options were discussed with her in detail.  She has left knee pain which was traumatic in nature while at work.  She had no pain of her knee prior to this.  Her standing x-rays do show some  arthritis but her pain is overall new since her traumatic injury.  She does have some mechanical symptoms.  We discussed treatment options including physical therapy as well as injections.  We also discussed rest and heat ice.  We also discussed MRI to evaluate intra-articular structures since she reports a traumatic event with new onset effusion.  We will order an MRI and she will follow up for results.    Follow up: for MRI/CT Results   X-rays next visit:  None

## 2022-10-07 ENCOUNTER — HOSPITAL ENCOUNTER (OUTPATIENT)
Dept: RADIOLOGY | Facility: HOSPITAL | Age: 52
Discharge: HOME | End: 2022-10-07
Attending: ORTHOPAEDIC SURGERY
Payer: MEDICAID

## 2022-10-07 DIAGNOSIS — S83.104A ACUTE TRAUMATIC INTERNAL DERANGEMENT OF RIGHT KNEE, INITIAL ENCOUNTER: ICD-10-CM

## 2022-10-07 PROCEDURE — 73721 MRI KNEE WITHOUT CONTRAST LEFT: ICD-10-PCS | Mod: 26,LT,, | Performed by: RADIOLOGY

## 2022-10-07 PROCEDURE — 73721 MRI JNT OF LWR EXTRE W/O DYE: CPT | Mod: TC,LT

## 2022-10-07 PROCEDURE — 73721 MRI JNT OF LWR EXTRE W/O DYE: CPT | Mod: 26,LT,, | Performed by: RADIOLOGY

## 2022-10-11 ENCOUNTER — OFFICE VISIT (OUTPATIENT)
Dept: ORTHOPEDICS | Facility: CLINIC | Age: 52
End: 2022-10-11
Payer: COMMERCIAL

## 2022-10-11 VITALS — BODY MASS INDEX: 40.32 KG/M2 | HEIGHT: 65 IN | WEIGHT: 242 LBS

## 2022-10-11 DIAGNOSIS — S83.282A ACUTE LATERAL MENISCUS TEAR OF LEFT KNEE, INITIAL ENCOUNTER: Primary | ICD-10-CM

## 2022-10-11 DIAGNOSIS — M17.12 ARTHRITIS OF LEFT KNEE: ICD-10-CM

## 2022-10-11 PROCEDURE — 99999 PR PBB SHADOW E&M-EST. PATIENT-LVL III: CPT | Mod: PBBFAC,,, | Performed by: ORTHOPAEDIC SURGERY

## 2022-10-11 PROCEDURE — 20610 PR DRAIN/INJECT LARGE JOINT/BURSA: ICD-10-PCS | Mod: LT,S$GLB,, | Performed by: ORTHOPAEDIC SURGERY

## 2022-10-11 PROCEDURE — 99213 PR OFFICE/OUTPT VISIT, EST, LEVL III, 20-29 MIN: ICD-10-PCS | Mod: 25,S$GLB,, | Performed by: ORTHOPAEDIC SURGERY

## 2022-10-11 PROCEDURE — 99213 OFFICE O/P EST LOW 20 MIN: CPT | Mod: 25,S$GLB,, | Performed by: ORTHOPAEDIC SURGERY

## 2022-10-11 PROCEDURE — 20610 DRAIN/INJ JOINT/BURSA W/O US: CPT | Mod: LT,S$GLB,, | Performed by: ORTHOPAEDIC SURGERY

## 2022-10-11 PROCEDURE — 99999 PR PBB SHADOW E&M-EST. PATIENT-LVL III: ICD-10-PCS | Mod: PBBFAC,,, | Performed by: ORTHOPAEDIC SURGERY

## 2022-10-11 RX ORDER — TRIAMCINOLONE ACETONIDE 40 MG/ML
40 INJECTION, SUSPENSION INTRA-ARTICULAR; INTRAMUSCULAR
Status: DISCONTINUED | OUTPATIENT
Start: 2022-10-11 | End: 2022-10-11 | Stop reason: HOSPADM

## 2022-10-11 RX ADMIN — TRIAMCINOLONE ACETONIDE 40 MG: 40 INJECTION, SUSPENSION INTRA-ARTICULAR; INTRAMUSCULAR at 10:10

## 2022-10-11 NOTE — PROCEDURES
Large Joint Aspiration/Injection: L knee joint    Date/Time: 10/11/2022 10:30 AM  Performed by: Bob Martines MD  Authorized by: Bob Martines MD     Consent Done?:  Yes (Verbal)  Indications:  Pain  Site marked: the procedure site was marked    Timeout: prior to procedure the correct patient, procedure, and site was verified      Details:  Needle Size:  22 G  Approach:  Anterolateral  Location:  Knee  Site:  L knee joint  Medications:  40 mg triamcinolone acetonide 40 mg/mL  Patient tolerance:  Patient tolerated the procedure well with no immediate complications

## 2022-10-11 NOTE — PROGRESS NOTES
Patient ID: Serene Davis is a 52 y.o. female    Chief Complaint:   Chief Complaint   Patient presents with    Left Knee - Follow-up, Results     MRI Review of Left Knee.       History of Present Illness:    Seerne Davis is a pleasant 52 y.o. female who presents for evaluation of left knee pain. She  reports pain for the past 2 weeks. She does endorse a history of trauma.  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  Bending and straightening makes it worse and rest makes it better. The pain is mostly in front of the knee and occasionally lateral. She does endorse nighttime pain.   She is independent with all activities of daily living.     In the past she has tried the following treatments:    NSAIDS     She currently does work.   Occupation:  Works at a assisted living facility    Instability: No  Mechanical Symptoms: Yes    Diabetic:  No  Smoker:  No      ____________________________________________________________________    Interval history 10/11/2022 : Patient returns today for MRI follow-up of their left knee. They report no changes since I saw them last.  Continues to have pain mostly in the lateral aspect of the knee and feels some instability when she is walking up and down steps.      PAST MEDICAL HISTORY:   Past Medical History:   Diagnosis Date    Graves disease      PAST SURGICAL HISTORY: History reviewed. No pertinent surgical history.  FAMILY HISTORY: History reviewed. No pertinent family history.  SOCIAL HISTORY:   Social History     Occupational History    Not on file   Tobacco Use    Smoking status: Not on file    Smokeless tobacco: Not on file   Substance and Sexual Activity    Alcohol use: Not on file    Drug use: Not on file    Sexual activity: Not on file        MEDICATIONS:   Current Outpatient Medications:     etodolac (LODINE) 300 MG Cap, Take 1 capsule (300 mg total) by mouth 2 (two) times  daily., Disp: 30 capsule, Rfl: 0    gabapentin (NEURONTIN) 800 MG tablet, Take 800 mg by mouth once daily., Disp: , Rfl:     levothyroxine (SYNTHROID) 100 MCG tablet, Take 100 mcg by mouth before breakfast., Disp: , Rfl:   ALLERGIES:   Review of patient's allergies indicates:   Allergen Reactions    Adhesive          Physical Exam     There were no vitals filed for this visit.    Alert and oriented to person, place and time. No acute distress. Well-groomed, not ill appearing. Pupils round and reactive, normal respiratory effort, no audible wheezing.     GENERAL:  A well-developed, well-nourished 52 y.o. female who is alert and       oriented in no acute distress.      Gait: She  walks with a antalgic gait.                   EXTREMITIES:  Examination of lower extremities reveals there is no visible mass or deformity.    Left knee:  ROM     Ligamentously stable to varus/valgus stress.    Anterior and posterior drawers negative.    No pain over pes bursa.    No warmth    No erythema     Effusion Yes    lateral joint line tenderness    Positive Patellofemoral grind/crepitus         The skin over both lower extremities is normal and unremarkable.  She has a  painless range of motion of the hips and ankles bilaterally.   Sensation is intact in both lower extremities.    There are no motor deficits in the lower extremities bilaterally.   Pedal pulses are palpable distally bilaterally.    She has no calf tenderness to palpation nor edema.         Imagin view  standing left knee X-rays ordered/reviewed by me showing no evidence of fracture or dislocation. There is no obvious malalignment. No evidence of masses, lesions or foreign bodies.  Mild to moderate degenerative changes, worse on the right knee.  Bone spur on the superior pole patella.    MRI left knee without contrast:  Interstitial tearing of the LCL.  Signal change in the lateral meniscus.  There is mild to moderate tricompartmental arthritic changes.   Patellofemoral arthritis.    Assessment & Plan    Acute lateral meniscus tear of left knee, initial encounter    Arthritis of left knee       I made the decision to obtain old records of the patient including previous notes and imaging. New imaging was ordered today of the extremity or extremities evaluated. I independently reviewed and interpreted the radiographs and/or MRIs/CT scan today as well as prior imaging.    We discussed at length different treatment options including conservative vs surgical management. These include anti-inflammatories, acetaminophen, rest, ice, heat, formal physical therapy including strengthening and stretching exercises, home exercise programs, injections, dry needling, and finally surgical intervention.      Patient would like to proceed with a trial of left knee injection with formal physical therapy and bracing    Consider arthroscopy with meniscus debridement and chondroplasty if pain is refractory to conservative treatment    All questions were answered and patient is agreeable to the above plan.           Follow up:  6 weeks  X-rays next visit:  None

## 2022-10-20 ENCOUNTER — CLINICAL SUPPORT (OUTPATIENT)
Dept: REHABILITATION | Facility: HOSPITAL | Age: 52
End: 2022-10-20
Attending: ORTHOPAEDIC SURGERY
Payer: COMMERCIAL

## 2022-10-20 DIAGNOSIS — R29.898 DECREASED STRENGTH INVOLVING KNEE JOINT: ICD-10-CM

## 2022-10-20 DIAGNOSIS — R26.9 GAIT ABNORMALITY: ICD-10-CM

## 2022-10-20 DIAGNOSIS — M25.662 DECREASED RANGE OF MOTION (ROM) OF LEFT KNEE: Primary | ICD-10-CM

## 2022-10-20 PROCEDURE — 97161 PT EVAL LOW COMPLEX 20 MIN: CPT | Mod: PN

## 2022-10-20 PROCEDURE — 97110 THERAPEUTIC EXERCISES: CPT | Mod: PN

## 2022-10-20 NOTE — PLAN OF CARE
[facility]  Physical Therapy Initial Evaluation    Date: 10/20/2022   Name: Serene Davis  Clinic Number: [id number]    Therapy Diagnosis:   Encounter Diagnoses   Name Primary?    Decreased range of motion (ROM) of left knee Yes    Decreased strength involving knee joint     Gait abnormality      Physician: Bob Martines MD    Physician Orders: PT Eval and Treat   Medical Diagnosis from Referral:   S83.282A (ICD-10-CM) - Acute lateral meniscus tear of left knee, initial encounter   M17.12 (ICD-10-CM) - Arthritis of left knee   Evaluation Date: 10/20/2022  Authorization Period Expiration: 10/11/2023  Plan of Care Expiration: 12/1/2022  Visit # / Visits authorized: 1/ 1    Time In: 1100a  Time Out: 1145a  Total Appointment Time (timed & untimed codes): 45 minutes    Precautions: Standard    Subjective   Date of onset: 2 weeks   History of current condition - Worth reports: left knee pain started about 2 weeks ago when a patient fell forward and her knee buckled trying to catch him. She states she tried to walk afterwards and fell to the ground because it hurt. Aggravating factors include bending, walking, sit<>stands; weightbearing. Easing factors include rest; letting leg hang down. Pt states she has not been taking rest breaks when her knee hurts. Pt states that trying to straighten her knee is the worst.      Medical History:   Past Medical History:   Diagnosis Date    Graves disease        Surgical History:   Serene Davis  has no past surgical history on file.    Medications:   Serene has a current medication list which includes the following prescription(s): etodolac, gabapentin, and levothyroxine.    Allergies:   Review of patient's allergies indicates:   Allergen Reactions    Adhesive         Imaging, MRI studies: 9/23/2022: Impression: Findings as detailed above including mild osseous degenerative changes, chondromalacia patella.  Small joint effusion.   Findings suggesting small intrasubstance tear or tendinopathy of the lateral collateral ligament and small tear mid body of the lateral meniscus    Prior Therapy: N  Occupation: CNA  Prior Level of Function: I  Current Level of Function: I; increased knee pain and difficulty with ADL's    Pain:  Current 5/10, worst 7/10, best 2/10   Location: left knee  Description: Aching, Tight, and Sharp  Aggravating Factors: Standing, Bending, Walking, Flexing, and Lifting  Easing Factors: relaxation    Patients goals: decrease pain     Objective   Gait:  No assistive device   Decreased knee EXT   Decreased step length    Decreased hip EXT       Observation:   (R + L) knee valgus         Range of Motion:   Knee Left active Left Passive Right Active R passive   Flexion 50 80; seated  130   Extension -5; 0 post treatment  Painful; unable to test +5 +5       Joint Mobility:  (L) patellar mobility is WNL  Tibiofemoral: R: WNL / L: unable to assess due to pain    Lower Extremity Strength    Quad Set: fair-  SLR: unable to perform    Right LE  Left LE    Hip extension:  3+/5 Hip extension: 3+/5   Hip abduction: 3+/5 Hip abduction: 3+/5       Special Tests:  - Maite's: NT    Functional Testing:       Balance Assessment:       Evaluation   Single Limb Stance R LE 15s  (<10 sec = HIGH FALL RISK)   Single Limb Stance L LE Unable to  perform  (<10 sec = HIGH FALL RISK)        Squat - unable to perform    Palpation: increased pain lateral joint line    Sensation: WNL       Edema: minimal edema        Limitation/Restriction for FOTO Knee Survey    Therapist reviewed FOTO scores for Serene Davis on 10/20/2022.   FOTO documents entered into [employer] - see Media section.    Limitation Score: 62%  Predcited Limitation Score: 39%         TREATMENT   Treatment Time In: 1125a  Treatment Time Out: 1150a  Total Treatment time (time-based codes) separate from Evaluation: 25 minutes    Serene received therapeutic exercises to develop  strength, endurance, ROM, and flexibility for 20 minutes including:   Heel prop 3 min    Supine Quad sets x20; 5sec holds    LAQ 90-45 x 30   SL Hip Abd 3 x 10   Supine Heel slides x 20    Education - stair negotiation / HEP     Serene received the following manual thapy techniques: Joint mobilizations were applied to the: L knee for 5 minutes, including:    L knee distraction   L knee post glide at 90   L knee IR with flexion     Home Exercises and Patient Education Provided    Education provided:   - HEP  - POC/prognosis  - pain-free ROM and walking     Written Home Exercises Provided: yes.  Exercises were reviewed and Serene was able to demonstrate them prior to the end of the session.  Serene demonstrated good  understanding of the education provided.     See EMR under Patient Instructions for exercises provided 10/20/2022.    Assessment   Serene is a 52 y.o. female referred to outpatient Physical Therapy with a medical diagnosis of Acute lateral meniscus tear of left knee, initial encounter and arthritis of Left knee. Patient presents with signs and symptoms consistent with mensicus pathology. Pt presents with limited knee AROM; LE weakness; tenderness of lateral joint line, and functional limitations of inability to walk and perform job activities without pain. Patient would benefit from skilled PT to further improve impairments and facilitate a return to PLOF.     Pt to be seen 1-2x/week for 8x weeks     Patient prognosis is Good.   Patientt will benefit from skilled outpatient Physical Therapy to address the deficits stated above and in the chart below, provide patient /family education, and to maximize patientt's level of independence.     Plan of care discussed with patient: Yes  Patient's spiritual, cultural and educational needs considered and patient is agreeable to the plan of care and goals as stated below:     Anticipated Barriers for therapy: none    Medical Necessity is demonstrated by the  following  History  Co-morbidities and personal factors that may impact the plan of care Co-morbidities:   Graves disease    Personal Factors:   no deficits     low   Examination  Body Structures and Functions, activity limitations and participation restrictions that may impact the plan of care Body Regions:   lower extremities    Body Systems:    gross symmetry  ROM  strength  gross coordinated movement  balance  gait  transfers  motor control    Participation Restrictions:   none    Activity limitations:   no deficits    General Tasks and Commands  no deficits    Communication  no deficits    Mobility  lifting and carrying objects  walking    Self care  no deficits    Domestic Life  no deficits    Interactions/Relationships  no deficits    Life Areas  employment    Community and Social Life  community life  recreation and leisure         moderate   Clinical Presentation stable and uncomplicated low   Decision Making/ Complexity Score: low     Goals:  Short Term Goals: (4 weeks)  1. Pt will be independent with HEP in order to supplement patient in improving functional mobility. - progressing, not met  2. Pt will improve (L) knee AROM to at least 0-120 in order to improve gait and ability to perform ADL's - progressing, not met  3. Pt will have a normalized gait pattern with no pain. - progressing, not met     Long Term Goals: (8 weeks)  1. Pt will be independent with updated HEP supplement PT in improving functional mobility. - progressing, not met  2. Pt will improve FOTO knee survey score to </= 39 % limited in order to demo improved functional mobility. - progressing, not met  3. Pt will return to work with MD clearance with 0/10 pain- progressing, not met  4. Pt will perform at least 10s of SLB to demonstrate improved tolerance to SL loading. - progressing, not met    Plan   Plan of care Certification: 10/20/2022 to 12/15/2022.    Outpatient Physical Therapy 2 times weekly for 8 weeks to include the following  interventions: Electrical Stimulation quad, Gait Training, Manual Therapy, Moist Heat/ Ice, Neuromuscular Re-ed, Patient Education, Self Care, Therapeutic Activities, and Therapeutic Exercise.     Smith Owusu, PT

## 2022-10-27 ENCOUNTER — CLINICAL SUPPORT (OUTPATIENT)
Dept: REHABILITATION | Facility: HOSPITAL | Age: 52
End: 2022-10-27
Payer: COMMERCIAL

## 2022-10-27 DIAGNOSIS — M25.662 DECREASED RANGE OF MOTION (ROM) OF LEFT KNEE: Primary | ICD-10-CM

## 2022-10-27 DIAGNOSIS — R29.898 DECREASED STRENGTH INVOLVING KNEE JOINT: ICD-10-CM

## 2022-10-27 DIAGNOSIS — M17.12 ARTHRITIS OF LEFT KNEE: ICD-10-CM

## 2022-10-27 DIAGNOSIS — R26.9 GAIT ABNORMALITY: ICD-10-CM

## 2022-10-27 PROCEDURE — 97110 THERAPEUTIC EXERCISES: CPT | Mod: PN

## 2022-10-27 NOTE — PROGRESS NOTES
Physical Therapy Treatment Note     Name: Serene Davis  Clinic Number: [id number]    Therapy Diagnosis:   Encounter Diagnoses   Name Primary?    Decreased range of motion (ROM) of left knee Yes    Decreased strength involving knee joint     Gait abnormality     Arthritis of left knee      Physician: Bob Martines MD    Visit Date: 10/27/2022    Physician Orders: PT Eval and Treat   Medical Diagnosis from Referral:   S83.282A (ICD-10-CM) - Acute lateral meniscus tear of left knee, initial encounter   M17.12 (ICD-10-CM) - Arthritis of left knee   Evaluation Date: 10/20/2022  Authorization Period Expiration: 12/31/2022  Plan of Care Expiration: 12/1/2022  Visit # / Visits authorized: 1/12     Time In: 1000a  Time Out: 1052a  Total Appointment Time (timed & untimed codes): 52 minutes     Precautions: Standard    Initial FOTO: 62% (39% predicted)  FOTO 1st F/U:   FOTO 2nd F/U:     Subjective     Pt reports: she is doing better but it still pops and pinches when she walks  She was compliant with home exercise program.  Response to previous treatment: felt fine  Functional change: ongoing    Pain: 3/10  Location: left knee      Objective       Range of Motion:   Knee Left active Left Passive Right Active R passive   Flexion 60 90; seated  130   Extension -3; 0 post treatment  Painful; unable to test +5 +5      SLR: unable to perform    Serene received therapeutic exercises to develop strength, endurance, ROM, and flexibility for 42 minutes including:                Heel prop 3 min    Nustep 6min AAROM of R knee              Supine Quad sets x20; 5sec holds               LAQ 90-45 x 30              SL Hip Abd 3 x 10   SL Hip ER 3 x 12               Standing Hip Ext 3 x 10    Seated Heel raise 10# 3 x 10   Shuttle SL Shuttle 12#               Education - HEP      Serene received the following manual thapy techniques: Joint mobilizations were applied to the: L knee for 10 minutes, including:     L knee  distraction   L knee IR with flexion   L knee medial glide with flexion        Home Exercises Provided and Patient Education Provided     Education provided:   - HEP    Written Home Exercises Provided: yes.  Exercises were reviewed and Serene was able to demonstrate them prior to the end of the session.  Serene demonstrated good  understanding of the education provided.     See EMR under Patient Instructions for exercises provided 10/27/2022.    Assessment   Serene has improved ROM on today's visit. Continuing to work on ROM exercises to improve mobility of R knee. Quad is improved today as well however she struggles to maintain TKE.      Serene Is progressing well towards her goals.   Pt prognosis is Good.     Pt will continue to benefit from skilled outpatient physical therapy to address the deficits listed in the problem list box on initial evaluation, provide pt/family education and to maximize pt's level of independence in the home and community environment.     Pt's spiritual, cultural and educational needs considered and pt agreeable to plan of care and goals.     Anticipated barriers to physical therapy: none    Goals:  Short Term Goals: (4 weeks)  1. Pt will be independent with HEP in order to supplement patient in improving functional mobility. - progressing, not met  2. Pt will improve (L) knee AROM to at least 0-120 in order to improve gait and ability to perform ADL's - progressing, not met  3. Pt will have a normalized gait pattern with no pain. - progressing, not met     Long Term Goals: (8 weeks)  1. Pt will be independent with updated HEP supplement PT in improving functional mobility. - progressing, not met  2. Pt will improve FOTO knee survey score to </= 39 % limited in order to demo improved functional mobility. - progressing, not met  3. Pt will return to work with MD clearance with 0/10 pain- progressing, not met  4. Pt will perform at least 10s of SLB to demonstrate improved tolerance to  SL loading. - progressing, not met     Plan   Plan of care Certification: 10/20/2022 to 12/15/2022.       Smith Owusu, PT

## 2022-11-03 ENCOUNTER — CLINICAL SUPPORT (OUTPATIENT)
Dept: REHABILITATION | Facility: HOSPITAL | Age: 52
End: 2022-11-03
Payer: MEDICAID

## 2022-11-03 DIAGNOSIS — M25.662 DECREASED RANGE OF MOTION (ROM) OF LEFT KNEE: Primary | ICD-10-CM

## 2022-11-03 NOTE — PROGRESS NOTES
Physical Therapy Treatment Note     Name: Serene Davis  Clinic Number: [id number]    Therapy Diagnosis:   Encounter Diagnosis   Name Primary?    Decreased range of motion (ROM) of left knee Yes       Physician: Bob Martines MD    Visit Date: 11/3/2022    Physician Orders: PT Eval and Treat   Medical Diagnosis from Referral:   S83.282A (ICD-10-CM) - Acute lateral meniscus tear of left knee, initial encounter   M17.12 (ICD-10-CM) - Arthritis of left knee   Evaluation Date: 10/20/2022  Authorization Period Expiration: 12/31/2022  Plan of Care Expiration: 12/1/2022  Visit # / Visits authorized: 2/12     Time In: 0912  Time Out: 0958  Total Appointment Time (timed & untimed codes): 46 minutes     Precautions: Standard    Initial FOTO: 62% (39% predicted)  FOTO 1st F/U:   FOTO 2nd F/U:     Subjective     Pt reports: Biggest complaint is a pinching pain that she gets at TKE   She was compliant with home exercise program.  Response to previous treatment: felt fine  Functional change: ongoing    Pain: 3/10  Location: left knee      Objective       Serene received therapeutic exercises to develop strength, endurance, ROM, and flexibility for 36 minutes including:                Heel prop 3 min    Nustep 6min AAROM of R knee              Supine Quad sets with heel prop 2 x 12 ; 5sec holds   SAQ, from 25* knee flexion, 2 x 10               LAQ 90-45 x 30   Standing TKE, 3 x 15 GTB               SL Hip Abd 3 x 10   SL clamshells 3 x 12 GTB              Standing Hip Ext 3 x 10    Seated Heel raise 10# 3 x 10   Shuttle SL Shuttle 12#               Education - HEP      Serene received the following manual thapy techniques: Joint mobilizations were applied to the: L knee for 10 minutes, including:     L knee distraction   L knee IR with flexion   L knee medial glide with flexion        Home Exercises Provided and Patient Education Provided     Education provided:   - HEP    Written Home Exercises Provided:  yes.  Exercises were reviewed and Serene was able to demonstrate them prior to the end of the session.  Serene demonstrated good  understanding of the education provided.     See EMR under Patient Instructions for exercises provided 10/27/2022.    Assessment   Patient ambulated into clinic wihtout TKE with slight antalgic gait. She was unable to demo TKE therefore heel prop continued. Session focused on quad strengthening; patient able to tolerate standing TKE today well. Pt with good tolerance to therapy session with no adverse effects reported.         Serene Is progressing well towards her goals.   Pt prognosis is Good.     Pt will continue to benefit from skilled outpatient physical therapy to address the deficits listed in the problem list box on initial evaluation, provide pt/family education and to maximize pt's level of independence in the home and community environment.     Pt's spiritual, cultural and educational needs considered and pt agreeable to plan of care and goals.     Anticipated barriers to physical therapy: none    Goals:  Short Term Goals: (4 weeks)  1. Pt will be independent with HEP in order to supplement patient in improving functional mobility. - progressing, not met  2. Pt will improve (L) knee AROM to at least 0-120 in order to improve gait and ability to perform ADL's - progressing, not met  3. Pt will have a normalized gait pattern with no pain. - progressing, not met     Long Term Goals: (8 weeks)  1. Pt will be independent with updated HEP supplement PT in improving functional mobility. - progressing, not met  2. Pt will improve FOTO knee survey score to </= 39 % limited in order to demo improved functional mobility. - progressing, not met  3. Pt will return to work with MD clearance with 0/10 pain- progressing, not met  4. Pt will perform at least 10s of SLB to demonstrate improved tolerance to SL loading. - progressing, not met     Plan   Plan of care Certification: 10/20/2022 to  12/15/2022.       Michi Cheek, PTA

## 2022-11-08 ENCOUNTER — CLINICAL SUPPORT (OUTPATIENT)
Dept: REHABILITATION | Facility: HOSPITAL | Age: 52
End: 2022-11-08
Payer: MEDICAID

## 2022-11-08 DIAGNOSIS — M25.662 DECREASED RANGE OF MOTION (ROM) OF LEFT KNEE: Primary | ICD-10-CM

## 2022-11-08 PROCEDURE — 97110 THERAPEUTIC EXERCISES: CPT | Mod: PN,CQ

## 2022-11-08 PROCEDURE — 97140 MANUAL THERAPY 1/> REGIONS: CPT | Mod: PN,CQ

## 2022-11-08 NOTE — PROGRESS NOTES
Physical Therapy Treatment Note     Name: Serene Davis  Clinic Number: [id number]    Therapy Diagnosis:   No diagnosis found.      Physician: Bob Martines MD    Visit Date: 11/8/2022    Physician Orders: PT Eval and Treat   Medical Diagnosis from Referral:   S83.282A (ICD-10-CM) - Acute lateral meniscus tear of left knee, initial encounter   M17.12 (ICD-10-CM) - Arthritis of left knee   Evaluation Date: 10/20/2022  Authorization Period Expiration: 12/31/2022  Plan of Care Expiration: 12/1/2022  Visit # / Visits authorized: 3/12     Time In: 1105  Time Out: 1241  Total Appointment Time (timed & untimed codes): 46 minutes     Precautions: Standard    Initial FOTO: 62% (39% predicted)  FOTO 1st F/U:   FOTO 2nd F/U:     Subjective     Pt reports: Biggest complaint is a pinching pain that she gets at [facility]   She was compliant with home exercise program.  Response to previous treatment: felt fine  Functional change: ongoing    Pain: 5/10  Location: left knee      Objective       Serene received therapeutic exercises to develop strength, endurance, ROM, and flexibility for 36 minutes including:                Heel prop 3 min    Nustep 6min AAROM of R knee              Supine Quad sets with heel prop 3 x 12 ; 5sec holds   SAQ, from 25* knee flexion, 2 x 10               LAQ 90-45 x 30 5#   Standing TKE, 3 x 15 GTB               SL Hip Abd 3 x 10   SL clamshells 3 x 12 GTB              Standing Hip Ext 3 x 10    Seated Heel raise 10# 3 x 10   Shuttle SL Shuttle 12#                 Serene received the following manual thapy techniques: Joint mobilizations were applied to the: L knee for 10 minutes, including:     L knee distraction   L knee IR with flexion   L knee medial glide with flexion        Home Exercises Provided and Patient Education Provided     Education provided:   - HEP    Written Home Exercises Provided: yes.  Exercises were reviewed and Serene was able to demonstrate them prior to the end of  the session.  Serene demonstrated good  understanding of the education provided.     See EMR under Patient Instructions for exercises provided 10/27/2022.    Assessment   Patient ambulated into clinic wihtout TKE with slight antalgic gait. She was unable to demo TKE therefore heel prop continued. Session focused on quad strengthening; patient able to tolerate standing TKE today well. Pt with good tolerance to therapy session with no adverse effects reported.         Serene Is progressing well towards her goals.   Pt prognosis is Good.     Pt will continue to benefit from skilled outpatient physical therapy to address the deficits listed in the problem list box on initial evaluation, provide pt/family education and to maximize pt's level of independence in the home and community environment.     Pt's spiritual, cultural and educational needs considered and pt agreeable to plan of care and goals.     Anticipated barriers to physical therapy: none    Goals:  Short Term Goals: (4 weeks)  1. Pt will be independent with HEP in order to supplement patient in improving functional mobility. - progressing, not met  2. Pt will improve (L) knee AROM to at least 0-120 in order to improve gait and ability to perform ADL's - progressing, not met  3. Pt will have a normalized gait pattern with no pain. - progressing, not met     Long Term Goals: (8 weeks)  1. Pt will be independent with updated HEP supplement PT in improving functional mobility. - progressing, not met  2. Pt will improve FOTO knee survey score to </= 39 % limited in order to demo improved functional mobility. - progressing, not met  3. Pt will return to work with MD clearance with 0/10 pain- progressing, not met  4. Pt will perform at least 10s of SLB to demonstrate improved tolerance to SL loading. - progressing, not met     Plan   Plan of care Certification: 10/20/2022 to 12/15/2022.       Michi Cheek, PTA

## 2022-11-15 ENCOUNTER — CLINICAL SUPPORT (OUTPATIENT)
Dept: REHABILITATION | Facility: HOSPITAL | Age: 52
End: 2022-11-15
Payer: MEDICAID

## 2022-11-15 DIAGNOSIS — M25.662 DECREASED RANGE OF MOTION (ROM) OF LEFT KNEE: Primary | ICD-10-CM

## 2022-11-15 PROCEDURE — 97110 THERAPEUTIC EXERCISES: CPT | Mod: PN,CQ

## 2022-11-15 NOTE — PROGRESS NOTES
Physical Therapy Treatment Note     Name: Serene Davis  Clinic Number: [id number]    Therapy Diagnosis:   No diagnosis found.      Physician: Bob Martines MD    Visit Date: 11/15/2022    Physician Orders: PT Eval and Treat   Medical Diagnosis from Referral:   S83.282A (ICD-10-CM) - Acute lateral meniscus tear of left knee, initial encounter   M17.12 (ICD-10-CM) - Arthritis of left knee   Evaluation Date: 10/20/2022  Authorization Period Expiration: 12/31/2022  Plan of Care Expiration: 12/1/2022  Visit # / Visits authorized: 3/12     Time In: 0900  Time Out: 0950  Total Appointment Time (timed & untimed codes): 50  minutes     Precautions: Standard    Initial FOTO: 62% (39% predicted)  FOTO 1st F/U:   FOTO 2nd F/U:     Subjective     Pt reports: Biggest complaint is a pinching pain that she gets at [facility]   She was compliant with home exercise program.  Response to previous treatment: felt fine  Functional change: ongoing    Pain: 5/10  Location: left knee      Objective      Serene, was supervised by a rehabilitation technician under the direction of the treating therapist.    Serene received therapeutic exercises to develop strength, endurance, ROM, and flexibility for 50 minutes including:                Heel prop 3 min    Nustep 6min AAROM of R knee              Supine Quad sets with heel prop 3 x 12 ; 5sec holds   SAQ, from 25* knee flexion, 2 x 10               LAQ 90-45 x 30 5#   Standing TKE, 3 x 15 GTB               SL Hip Abd 3 x 10   SL clamshells 3 x 12 GTB              Standing Hip Ext 3 x 10    Shuttle SL Shuttle 12#  Shuttle donkey kicks, 3 x 10 12# B                    Next:  HS triple threat    Serene received the following manual thapy techniques: Joint mobilizations were applied to the: L knee for 0 minutes, including:     L knee distraction   L knee IR with flexion   L knee medial glide with flexion        Home Exercises Provided and Patient Education Provided     Education  provided:   - HEP    Written Home Exercises Provided: yes.  Exercises were reviewed and Serene was able to demonstrate them prior to the end of the session.  Serene demonstrated good  understanding of the education provided.     See EMR under Patient Instructions for exercises provided 10/27/2022.    Assessment   Patient continues to report pinching at lateral knee. She was able to progress in reps with SL press on shuttle and also perform increased glute strengthening. May benefit from further posterior chain strengthening to improve patellar tracking and knee stability. Pt with good tolerance to therapy session with no adverse effects reported.           Serene Is progressing well towards her goals.   Pt prognosis is Good.     Pt will continue to benefit from skilled outpatient physical therapy to address the deficits listed in the problem list box on initial evaluation, provide pt/family education and to maximize pt's level of independence in the home and community environment.     Pt's spiritual, cultural and educational needs considered and pt agreeable to plan of care and goals.     Anticipated barriers to physical therapy: none    Goals:  Short Term Goals: (4 weeks)  1. Pt will be independent with HEP in order to supplement patient in improving functional mobility. - progressing, not met  2. Pt will improve (L) knee AROM to at least 0-120 in order to improve gait and ability to perform ADL's - progressing, not met  3. Pt will have a normalized gait pattern with no pain. - progressing, not met     Long Term Goals: (8 weeks)  1. Pt will be independent with updated HEP supplement PT in improving functional mobility. - progressing, not met  2. Pt will improve FOTO knee survey score to </= 39 % limited in order to demo improved functional mobility. - progressing, not met  3. Pt will return to work with MD clearance with 0/10 pain- progressing, not met  4. Pt will perform at least 10s of SLB to demonstrate  improved tolerance to SL loading. - progressing, not met     Plan   Plan of care Certification: 10/20/2022 to 12/15/2022.       Michi Cheek, PTA

## 2022-11-17 ENCOUNTER — CLINICAL SUPPORT (OUTPATIENT)
Dept: REHABILITATION | Facility: HOSPITAL | Age: 52
End: 2022-11-17
Payer: MEDICAID

## 2022-11-17 DIAGNOSIS — M25.662 DECREASED RANGE OF MOTION (ROM) OF LEFT KNEE: Primary | ICD-10-CM

## 2022-11-17 PROCEDURE — 97014 ELECTRIC STIMULATION THERAPY: CPT | Mod: PN,CQ

## 2022-11-17 PROCEDURE — 97110 THERAPEUTIC EXERCISES: CPT | Mod: PN,CQ

## 2022-11-17 NOTE — PROGRESS NOTES
Physical Therapy Treatment Note     Name: Serene Davis  Clinic Number: [id number]    Therapy Diagnosis:   No diagnosis found.      Physician: Bob Martines MD    Visit Date: 11/17/2022    Physician Orders: PT Eval and Treat   Medical Diagnosis from Referral:   S83.282A (ICD-10-CM) - Acute lateral meniscus tear of left knee, initial encounter   M17.12 (ICD-10-CM) - Arthritis of left knee   Evaluation Date: 10/20/2022  Authorization Period Expiration: 12/31/2022  Plan of Care Expiration: 12/1/2022  Visit # / Visits authorized: 5/12     Time In: 0900  Time Out: 0954  Total Appointment Time (timed & untimed codes): 54  minutes     Precautions: Standard    Initial FOTO: 62% (39% predicted)  FOTO 1st F/U:   FOTO 2nd F/U:     Subjective     Pt reports: Patient stated she had a mis-step when ascending some stairs and reports increased pain and irritation of her L knee today.   She was compliant with home exercise program.  Response to previous treatment: felt fine  Functional change: ongoing    Pain: 5/10  Location: left knee      Objective      Serene, was supervised by a rehabilitation technician under the direction of the treating therapist.    Serene received therapeutic exercises to develop strength, endurance, ROM, and flexibility for 31 minutes including:                Heel prop 3 min    Nustep 8 min  AAROM of R knee              Supine Quad sets with heel prop 3 x 12 ; 5sec holds       supervised modalities after being cleared for contradictions: IFC Electrical Stimulation:  Serene received IFC Electrical Stimulation for pain control applied to the L knee 23 minutes. Serene tolderated treatment well without any adverse effects.         NP:  SAQ, from 25* knee flexion, 2 x 10               LAQ 90-45 x 30 5#   Standing TKE, 3 x 15 GTB               SL Hip Abd 3 x 10   SL clamshells 3 x 12 GTB              Standing Hip Ext 3 x 10    Shuttle SL Shuttle 12#  Shuttle donkey kicks, 3 x 10 12# B                     Next:  HS triple threat    Serene received the following manual thapy techniques: Joint mobilizations were applied to the: L knee for 0 minutes, including:     L knee distraction   L knee IR with flexion   L knee medial glide with flexion        Home Exercises Provided and Patient Education Provided     Education provided:   - HEP    Written Home Exercises Provided: yes.  Exercises were reviewed and Serene was able to demonstrate them prior to the end of the session.  Serene demonstrated good  understanding of the education provided.     See EMR under Patient Instructions for exercises provided 10/27/2022.    Assessment   Patient with increased pain and irritibility with knee extension this date and she reported to much discomfort to perform most therex. Ifc utilized for pain modulation.          Serene Is progressing well towards her goals.   Pt prognosis is Good.     Pt will continue to benefit from skilled outpatient physical therapy to address the deficits listed in the problem list box on initial evaluation, provide pt/family education and to maximize pt's level of independence in the home and community environment.     Pt's spiritual, cultural and educational needs considered and pt agreeable to plan of care and goals.     Anticipated barriers to physical therapy: none    Goals:  Short Term Goals: (4 weeks)  1. Pt will be independent with HEP in order to supplement patient in improving functional mobility. - progressing, not met  2. Pt will improve (L) knee AROM to at least 0-120 in order to improve gait and ability to perform ADL's - progressing, not met  3. Pt will have a normalized gait pattern with no pain. - progressing, not met     Long Term Goals: (8 weeks)  1. Pt will be independent with updated HEP supplement PT in improving functional mobility. - progressing, not met  2. Pt will improve FOTO knee survey score to </= 39 % limited in order to demo improved functional mobility. -  progressing, not met  3. Pt will return to work with MD clearance with 0/10 pain- progressing, not met  4. Pt will perform at least 10s of SLB to demonstrate improved tolerance to SL loading. - progressing, not met     Plan   Plan of care Certification: 10/20/2022 to 12/15/2022.       Michi Cheek, PTA

## 2022-11-22 ENCOUNTER — OFFICE VISIT (OUTPATIENT)
Dept: ORTHOPEDICS | Facility: CLINIC | Age: 52
End: 2022-11-22
Payer: MEDICAID

## 2022-11-22 VITALS — BODY MASS INDEX: 40.32 KG/M2 | RESPIRATION RATE: 16 BRPM | HEIGHT: 65 IN | WEIGHT: 242 LBS

## 2022-11-22 DIAGNOSIS — S83.282A ACUTE LATERAL MENISCUS TEAR OF LEFT KNEE, INITIAL ENCOUNTER: Primary | ICD-10-CM

## 2022-11-22 DIAGNOSIS — M17.12 ARTHRITIS OF LEFT KNEE: ICD-10-CM

## 2022-11-22 PROCEDURE — 99213 OFFICE O/P EST LOW 20 MIN: CPT | Mod: PBBFAC,PN | Performed by: ORTHOPAEDIC SURGERY

## 2022-11-22 PROCEDURE — 1159F MED LIST DOCD IN RCRD: CPT | Mod: CPTII,,, | Performed by: ORTHOPAEDIC SURGERY

## 2022-11-22 PROCEDURE — 1159F PR MEDICATION LIST DOCUMENTED IN MEDICAL RECORD: ICD-10-PCS | Mod: CPTII,,, | Performed by: ORTHOPAEDIC SURGERY

## 2022-11-22 PROCEDURE — 3008F BODY MASS INDEX DOCD: CPT | Mod: CPTII,,, | Performed by: ORTHOPAEDIC SURGERY

## 2022-11-22 PROCEDURE — 99999 PR PBB SHADOW E&M-EST. PATIENT-LVL III: CPT | Mod: PBBFAC,,, | Performed by: ORTHOPAEDIC SURGERY

## 2022-11-22 PROCEDURE — 99213 PR OFFICE/OUTPT VISIT, EST, LEVL III, 20-29 MIN: ICD-10-PCS | Mod: S$PBB,,, | Performed by: ORTHOPAEDIC SURGERY

## 2022-11-22 PROCEDURE — 99213 OFFICE O/P EST LOW 20 MIN: CPT | Mod: S$PBB,,, | Performed by: ORTHOPAEDIC SURGERY

## 2022-11-22 PROCEDURE — 99999 PR PBB SHADOW E&M-EST. PATIENT-LVL III: ICD-10-PCS | Mod: PBBFAC,,, | Performed by: ORTHOPAEDIC SURGERY

## 2022-11-22 PROCEDURE — 3008F PR BODY MASS INDEX (BMI) DOCUMENTED: ICD-10-PCS | Mod: CPTII,,, | Performed by: ORTHOPAEDIC SURGERY

## 2022-11-22 RX ORDER — TIRZEPATIDE 2.5 MG/.5ML
2.5 INJECTION, SOLUTION SUBCUTANEOUS
COMMUNITY
Start: 2022-10-05 | End: 2023-01-26 | Stop reason: SDDI

## 2022-11-22 NOTE — PROGRESS NOTES
Patient ID: Serene Davis is a 52 y.o. female    Chief Complaint:   Chief Complaint   Patient presents with    Left Knee - Pain       History of Present Illness:    Serene Davis is a pleasant 52 y.o. female who presents for evaluation of left knee pain. She  reports pain for the past 2 weeks. She does endorse a history of trauma.  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  Bending and straightening makes it worse and rest makes it better. The pain is mostly in front of the knee and occasionally lateral. She does endorse nighttime pain.   She is independent with all activities of daily living.     In the past she has tried the following treatments:    NSAIDS     She currently does work.   Occupation:  Works at a assisted living facility    Instability: No  Mechanical Symptoms: Yes    Diabetic:  No  Smoker:  No      ____________________________________________________________________    Interval history 11/22/2022 : Patient returns today for re-evaluation of her left knee.  She is being treated non operatively for a left knee LCL strain and lateral meniscus tear.  Had an injection last visit which really helped her pain.  Has been doing well with physical therapy and feels that this is really helping.      PAST MEDICAL HISTORY:   Past Medical History:   Diagnosis Date    Graves disease      PAST SURGICAL HISTORY: No past surgical history on file.  FAMILY HISTORY: No family history on file.  SOCIAL HISTORY:   Social History     Occupational History    Not on file   Tobacco Use    Smoking status: Not on file    Smokeless tobacco: Not on file   Substance and Sexual Activity    Alcohol use: Not on file    Drug use: Not on file    Sexual activity: Not on file        MEDICATIONS:   Current Outpatient Medications:     etodolac (LODINE) 300 MG Cap, Take 1 capsule (300 mg total) by mouth 2 (two) times daily., Disp: 30  capsule, Rfl: 0    gabapentin (NEURONTIN) 800 MG tablet, Take 800 mg by mouth once daily., Disp: , Rfl:     levothyroxine (SYNTHROID) 100 MCG tablet, Take 100 mcg by mouth before breakfast., Disp: , Rfl:     tirzepatide (MOUNJARO) 2.5 mg/0.5 mL PnIj, Inject 2.5 mg into the skin., Disp: , Rfl:   ALLERGIES:   Review of patient's allergies indicates:   Allergen Reactions    Adhesive          Physical Exam     Vitals:    22 1006   Resp: 16       Alert and oriented to person, place and time. No acute distress. Well-groomed, not ill appearing. Pupils round and reactive, normal respiratory effort, no audible wheezing.     GENERAL:  A well-developed, well-nourished 52 y.o. female who is alert and       oriented in no acute distress.      Gait: She  walks with a antalgic gait.                   EXTREMITIES:  Examination of lower extremities reveals there is no visible mass or deformity.    Left knee:  ROM 0-115.  Pain with terminal extension    Ligamentously stable to varus/valgus stress.    Anterior and posterior drawers negative.    No pain over pes bursa.    No warmth    No erythema     Effusion Yes, but improved    lateral joint line tenderness    Positive Patellofemoral grind/crepitus         The skin over both lower extremities is normal and unremarkable.  She has a  painless range of motion of the hips and ankles bilaterally.   Sensation is intact in both lower extremities.    There are no motor deficits in the lower extremities bilaterally.   Pedal pulses are palpable distally bilaterally.    She has no calf tenderness to palpation nor edema.         Imagin view  standing left knee X-rays ordered/reviewed by me showing no evidence of fracture or dislocation. There is no obvious malalignment. No evidence of masses, lesions or foreign bodies.  Mild to moderate degenerative changes, worse on the right knee.  Bone spur on the superior pole patella.    MRI left knee without contrast:  Interstitial tearing of  the LCL.  Signal change in the lateral meniscus.  There is mild to moderate tricompartmental arthritic changes.  Patellofemoral arthritis.    Assessment & Plan    Acute lateral meniscus tear of left knee, initial encounter    Arthritis of left knee         She is continuing to improve status post injection and currently undergoing formal physical therapy.  She wants to continue with therapy.  I would like to see her back in 6 weeks and see how she is doing.  If no significant improvement we did discuss arthroscopy with lateral meniscus debridement and chondroplasty.  Patient wants to stay nonoperative and conservative for now which is reasonable.          Follow up:  6 weeks  X-rays next visit:  None

## 2022-11-29 ENCOUNTER — TELEPHONE (OUTPATIENT)
Dept: ORTHOPEDICS | Facility: CLINIC | Age: 52
End: 2022-11-29
Payer: MEDICAID

## 2022-11-29 DIAGNOSIS — S83.282A ACUTE LATERAL MENISCUS TEAR OF LEFT KNEE, INITIAL ENCOUNTER: Primary | ICD-10-CM

## 2022-11-29 NOTE — TELEPHONE ENCOUNTER
----- Message from Casimiro Ramos sent at 11/29/2022 12:43 PM CST -----  Regarding: needs orders sent to PT, call pt   Contact: pt   needs orders sent to PT, call pt

## 2022-12-08 ENCOUNTER — CLINICAL SUPPORT (OUTPATIENT)
Dept: REHABILITATION | Facility: HOSPITAL | Age: 52
End: 2022-12-08
Payer: MEDICAID

## 2022-12-08 DIAGNOSIS — R29.898 DECREASED STRENGTH INVOLVING KNEE JOINT: ICD-10-CM

## 2022-12-08 DIAGNOSIS — M17.12 ARTHRITIS OF LEFT KNEE: ICD-10-CM

## 2022-12-08 DIAGNOSIS — M25.662 DECREASED RANGE OF MOTION (ROM) OF LEFT KNEE: Primary | ICD-10-CM

## 2022-12-08 DIAGNOSIS — R26.9 GAIT ABNORMALITY: ICD-10-CM

## 2022-12-08 PROCEDURE — 97110 THERAPEUTIC EXERCISES: CPT | Mod: PN

## 2022-12-08 NOTE — PROGRESS NOTES
Physical Therapy Treatment Note     Name: Serene Davis  Clinic Number: [id number]    Therapy Diagnosis:   Encounter Diagnoses   Name Primary?    Decreased range of motion (ROM) of left knee Yes    Decreased strength involving knee joint     Gait abnormality     Arthritis of left knee          Physician: Bob Martines MD    Visit Date: 12/8/2022    Physician Orders: PT Eval and Treat   Medical Diagnosis from Referral:   S83.282A (ICD-10-CM) - Acute lateral meniscus tear of left knee, initial encounter   M17.12 (ICD-10-CM) - Arthritis of left knee   Evaluation Date: 10/20/2022  Authorization Period Expiration: 12/31/2022  Plan of Care Expiration: 1/8/2022  Visit # / Visits authorized: 6/12     Time In: 900a  Time Out: 955a  Total Appointment Time (timed & untimed codes): 55 minutes     Precautions: Standard    Initial FOTO: 62% (39% predicted)  FOTO 1st F/U:   FOTO 2nd F/U:     Subjective     Pt reports: she is doing much better. States she is walking better. Less pain and knee getting straighter    She was compliant with home exercise program.  Response to previous treatment: felt fine  Functional change: ongoing    Pain: 5/10  Location: left knee      Objective      Assessment:    ROM: -5 ext; 125 flexion   SLB: 10s   Quad set: good    DL Squat: able to perform, minimal pain    Serene received therapeutic exercises to develop strength, endurance, ROM, and flexibility for 45 minutes including:                 Nustep 7 min Level 2    Quad set on heel prop    LAQ 4 x 8 10#    SL Hip Abd 2 x 10    DL Bridge 3 x 10    Hip Abd Machine 110# 4 x 8    Shuttle DL 50# 4 x fatigue                     Serene received the following manual thapy techniques: Joint mobilizations were applied to the: L knee for 10 minutes, including:     L knee distraction   L knee IR with flexion   L knee lateral glide with extension        Home Exercises Provided and Patient Education Provided     Education provided:   -  HEP    Written Home Exercises Provided: yes.  Exercises were reviewed and Serene was able to demonstrate them prior to the end of the session.  Serene demonstrated good  understanding of the education provided.     See EMR under Patient Instructions for exercises provided 10/27/2022.    Assessment   Serene much improved today. ROM is -5-0-125. She is walking with less knee flexion in stance. Focused on strength training today to improve quad strength to off load meniscus. SLB and HEP goals met. I think Ms. Davis would benefit from an additional month of PT focused on strength training to continue to improve tolerance to L knee loading with walking.        Serene Is progressing well towards her goals.   Pt prognosis is Good.     Pt will continue to benefit from skilled outpatient physical therapy to address the deficits listed in the problem list box on initial evaluation, provide pt/family education and to maximize pt's level of independence in the home and community environment.     Pt's spiritual, cultural and educational needs considered and pt agreeable to plan of care and goals.     Anticipated barriers to physical therapy: none    Goals:  Short Term Goals: (4 weeks)  1. Pt will be independent with HEP in order to supplement patient in improving functional mobility. -  met  2. Pt will improve (L) knee AROM to at least 0-120 in order to improve gait and ability to perform ADL's - progressing, not met  3. Pt will have a normalized gait pattern with no pain. - progressing, not met     Long Term Goals: (8 weeks)  1. Pt will be independent with updated HEP supplement PT in improving functional mobility. - progressing, not met  2. Pt will improve FOTO knee survey score to </= 39 % limited in order to demo improved functional mobility. - progressing, not met  3. Pt will return to work with MD clearance with 0/10 pain- progressing, not met  4. Pt will perform at least 10s of SLB to demonstrate improved tolerance to  SL loading. -  met     Plan   Plan of care Certification: 10/20/2022 to 1/8/2022.       Smith Owusu, PT

## 2022-12-20 ENCOUNTER — CLINICAL SUPPORT (OUTPATIENT)
Dept: REHABILITATION | Facility: HOSPITAL | Age: 52
End: 2022-12-20
Payer: MEDICAID

## 2022-12-20 DIAGNOSIS — M25.662 DECREASED RANGE OF MOTION (ROM) OF LEFT KNEE: Primary | ICD-10-CM

## 2022-12-20 DIAGNOSIS — R26.9 GAIT ABNORMALITY: ICD-10-CM

## 2022-12-20 DIAGNOSIS — R29.898 DECREASED STRENGTH INVOLVING KNEE JOINT: ICD-10-CM

## 2022-12-20 PROCEDURE — 97110 THERAPEUTIC EXERCISES: CPT | Mod: PN

## 2022-12-20 NOTE — PROGRESS NOTES
Physical Therapy Treatment Note     Name: Serene Davis  Clinic Number: [id number]    Therapy Diagnosis:   Encounter Diagnoses   Name Primary?    Decreased range of motion (ROM) of left knee Yes    Decreased strength involving knee joint     Gait abnormality        Physician: Bob Martines MD    Visit Date: 12/20/2022    Physician Orders: PT Eval and Treat   Medical Diagnosis from Referral:   S83.282A (ICD-10-CM) - Acute lateral meniscus tear of left knee, initial encounter   M17.12 (ICD-10-CM) - Arthritis of left knee   Evaluation Date: 10/20/2022  Authorization Period Expiration: 12/31/2022  Plan of Care Expiration: 1/8/2022  Visit # / Visits authorized: 7/12     Time In: 109p  Time Out: 155p  Total Appointment Time (timed & untimed codes): 46 minutes     Precautions: Standard    Initial FOTO: 62% (39% predicted)  FOTO 1st F/U:   FOTO 2nd F/U:     Subjective     Pt reports: she is feeling stiff. Pain is down though     She was compliant with home exercise program.  Response to previous treatment: felt fine  Functional change: ongoing    Pain: 5/10  Location: left knee      Objective      Assessment:    ROM: -5 ext; 125 flexion   SLB: 10s   Quad set: good    DL Squat: able to perform, minimal pain    Serene received therapeutic exercises to develop strength, endurance, ROM, and flexibility for 40 minutes including:                 Upright Bike 8 min; Level 1    Quad set on heel prop    SL LAQ; 90-45; 4 x 8 15#    HS curls 3 x 15 40#    SL Hip Abd 3# 3 x 8     DL Bridge with GTB 3 x 8    Shuttle SL 62# 3 x 8    Sled 20# 6 x 10 yards   Education - HEP                   Serene received the following manual thapy techniques: Joint mobilizations were applied to the: L knee for 6 minutes, including:     L knee distraction   L knee medial glide   L knee IR with flexion   L knee lateral glide with extension        Home Exercises Provided and Patient Education Provided     Education provided:   -  HEP    Written Home Exercises Provided: yes.  Exercises were reviewed and Serene was able to demonstrate them prior to the end of the session.  Serene demonstrated good  understanding of the education provided.     See EMR under Patient Instructions for exercises provided 10/27/2022.    Assessment   Serene continues to demo improvement. Able to achieve TKE in CKC which is a good sign. Pt agreeable to d/c at conclusion of current POC.        Serene Is progressing well towards her goals.   Pt prognosis is Good.     Pt will continue to benefit from skilled outpatient physical therapy to address the deficits listed in the problem list box on initial evaluation, provide pt/family education and to maximize pt's level of independence in the home and community environment.     Pt's spiritual, cultural and educational needs considered and pt agreeable to plan of care and goals.     Anticipated barriers to physical therapy: none    Goals:  Short Term Goals: (4 weeks)  1. Pt will be independent with HEP in order to supplement patient in improving functional mobility. -  met  2. Pt will improve (L) knee AROM to at least 0-120 in order to improve gait and ability to perform ADL's - progressing, not met  3. Pt will have a normalized gait pattern with no pain. - progressing, not met     Long Term Goals: (8 weeks)  1. Pt will be independent with updated HEP supplement PT in improving functional mobility. - progressing, not met  2. Pt will improve FOTO knee survey score to </= 39 % limited in order to demo improved functional mobility. - progressing, not met  3. Pt will return to work with MD clearance with 0/10 pain- progressing, not met  4. Pt will perform at least 10s of SLB to demonstrate improved tolerance to SL loading. -  met     Plan   Plan of care Certification: 10/20/2022 to 1/8/2022.       Smith Owusu, PT

## 2022-12-28 ENCOUNTER — CLINICAL SUPPORT (OUTPATIENT)
Dept: REHABILITATION | Facility: HOSPITAL | Age: 52
End: 2022-12-28
Payer: MEDICAID

## 2022-12-28 DIAGNOSIS — M25.662 DECREASED RANGE OF MOTION (ROM) OF LEFT KNEE: Primary | ICD-10-CM

## 2022-12-28 DIAGNOSIS — M17.12 ARTHRITIS OF LEFT KNEE: ICD-10-CM

## 2022-12-28 DIAGNOSIS — R26.9 GAIT ABNORMALITY: ICD-10-CM

## 2022-12-28 DIAGNOSIS — R29.898 DECREASED STRENGTH INVOLVING KNEE JOINT: ICD-10-CM

## 2022-12-28 PROCEDURE — 97110 THERAPEUTIC EXERCISES: CPT | Mod: PN

## 2022-12-28 NOTE — PROGRESS NOTES
Physical Therapy Treatment Note     Name: Serene Davis  Clinic Number: [id number]    Therapy Diagnosis:   Encounter Diagnoses   Name Primary?    Decreased range of motion (ROM) of left knee Yes    Decreased strength involving knee joint     Gait abnormality     Arthritis of left knee      Physician: Bob Martines MD    Visit Date: 12/28/2022    Physician Orders: PT Eval and Treat   Medical Diagnosis from Referral:   S83.282A (ICD-10-CM) - Acute lateral meniscus tear of left knee, initial encounter   M17.12 (ICD-10-CM) - Arthritis of left knee   Evaluation Date: 10/20/2022  Authorization Period Expiration: 12/31/2022  Plan of Care Expiration: 1/8/2022  Visit # / Visits authorized: 8/12     Time In: 1000a  Time Out: 1046a  Total Appointment Time (timed & untimed codes): 46 minutes     Precautions: Standard    Initial FOTO: 62% (39% predicted)  FOTO 1st F/U:   FOTO 2nd F/U:     Subjective     Pt reports: she is feeling stiff. Pain is down though     She was compliant with home exercise program.  Response to previous treatment: felt fine  Functional change: ongoing    Pain: 5/10  Location: left knee      Objective      Assessment:    ROM: -5 ext; 125 flexion   SLB: 10s   Quad set: good    DL Squat: able to perform, minimal pain    Serene received therapeutic exercises to develop strength, endurance, ROM, and flexibility for 46 minutes including:                 Upright Bike 8 min; Level 1    SLR 3 x 8    Side Steps GTB 3 x 5 laps at EOM    Shuttle Hip Ext 12# 3 x 8   Shuttle # 3 x 15     Shuttle SL 37# 3 x 8   LAQ 10# 90-45; 3 x 8 B    DL Mini Squat on wall with Hip Hinge 0-45 3 x 10     Sled 20# 6 x 10 yards   Education - HEP                   Serene received the following manual thapy techniques: Joint mobilizations were applied to the: L knee for 00 minutes, including:     L knee distraction         Home Exercises Provided and Patient Education Provided     Education provided:   -  HEP    Written Home Exercises Provided: yes.  Exercises were reviewed and Serene was able to demonstrate them prior to the end of the session.  Serene demonstrated good  understanding of the education provided.     See EMR under Patient Instructions for exercises provided 10/27/2022.    Assessment   Spoke today with Serene about d/c plan and long term status of her knee. She understands that clicking will likely persists given torn mensicus. She understands that weight gain also has effected her knee pain. She is seeking nutritional advice and wants to incorporate long term exercises into her lifestyle. Progressed to more CKC exercises today without adverse effects aside from some clicking noted. Will continue to progress as able.        Serene Is progressing well towards her goals.   Pt prognosis is Good.     Pt will continue to benefit from skilled outpatient physical therapy to address the deficits listed in the problem list box on initial evaluation, provide pt/family education and to maximize pt's level of independence in the home and community environment.     Pt's spiritual, cultural and educational needs considered and pt agreeable to plan of care and goals.     Anticipated barriers to physical therapy: none    Goals:  Short Term Goals: (4 weeks)  1. Pt will be independent with HEP in order to supplement patient in improving functional mobility. -  met  2. Pt will improve (L) knee AROM to at least 0-120 in order to improve gait and ability to perform ADL's - progressing, not met  3. Pt will have a normalized gait pattern with no pain. - progressing, not met     Long Term Goals: (8 weeks)  1. Pt will be independent with updated HEP supplement PT in improving functional mobility. - progressing, not met  2. Pt will improve FOTO knee survey score to </= 39 % limited in order to demo improved functional mobility. - progressing, not met  3. Pt will return to work with MD clearance with 0/10 pain- progressing,  not met  4. Pt will perform at least 10s of SLB to demonstrate improved tolerance to SL loading. -  met     Plan   Plan of care Certification: 10/20/2022 to 1/8/2022.       Smith Owusu, PT                        all other ROS negative except as per HPI

## 2023-01-06 ENCOUNTER — CLINICAL SUPPORT (OUTPATIENT)
Dept: REHABILITATION | Facility: HOSPITAL | Age: 53
End: 2023-01-06
Payer: MEDICAID

## 2023-01-06 ENCOUNTER — TELEPHONE (OUTPATIENT)
Dept: ORTHOPEDICS | Facility: CLINIC | Age: 53
End: 2023-01-06
Payer: MEDICAID

## 2023-01-06 DIAGNOSIS — M25.662 DECREASED RANGE OF MOTION (ROM) OF LEFT KNEE: Primary | ICD-10-CM

## 2023-01-06 DIAGNOSIS — R26.9 GAIT ABNORMALITY: ICD-10-CM

## 2023-01-06 DIAGNOSIS — M17.12 ARTHRITIS OF LEFT KNEE: ICD-10-CM

## 2023-01-06 DIAGNOSIS — R29.898 DECREASED STRENGTH INVOLVING KNEE JOINT: ICD-10-CM

## 2023-01-06 PROCEDURE — 97110 THERAPEUTIC EXERCISES: CPT | Mod: PN

## 2023-01-06 NOTE — PROGRESS NOTES
Physical Therapy Discharge Summary     Name: Serene Davis  Clinic Number: [id number]    Therapy Diagnosis:   Encounter Diagnoses   Name Primary?    Decreased range of motion (ROM) of left knee Yes    Decreased strength involving knee joint     Arthritis of left knee     Gait abnormality        Physician: Bob Martines MD    Visit Date: 1/6/2023    Physician Orders: PT Eval and Treat   Medical Diagnosis from Referral:   S83.282A (ICD-10-CM) - Acute lateral meniscus tear of left knee, initial encounter   M17.12 (ICD-10-CM) - Arthritis of left knee   Evaluation Date: 10/20/2022  Authorization Period Expiration: 12/31/2022  Plan of Care Expiration: 1/8/2022  Visit # / Visits authorized: 9/12     Time In: 900a  Time Out: 950a  Total Appointment Time (timed & untimed codes): 50 minutes     Precautions: Standard    Initial FOTO: 62% (39% predicted)  FOTO 1st F/U:   FOTO 2nd F/U:     Subjective     Pt reports: she is feeling stiff. Pain is down though     She was compliant with home exercise program.  Response to previous treatment: felt fine  Functional change: ongoing    Pain: 5/10  Location: left knee      Objective      Assessment:    ROM: -5 ext; 125 flexion   SLB: 10s   Quad set: good    DL Squat: able to perform, minimal pain    Serene received therapeutic exercises to develop strength, endurance, ROM, and flexibility for 50 minutes including:                 Upright Bike 8 min; Level 1    SL Hip abd 3 x 10    DL Bridge 3 x 10    SL Hip ER 3 x 10    Side Steps GTB 3 x 5 laps at EOM    Shuttle Hip Ext 12# 3 x 8   Shuttle # 3 x 15     Shuttle SL 50# 3 x 8   LAQ 20# 90-45; 3 x 8 B     Sled 20# 6 x 10 yards   Education - HEP          Home Exercises Provided and Patient Education Provided     Education provided:   - HEP    Written Home Exercises Provided: yes.  Exercises were reviewed and Serene was able to demonstrate them prior to the end of the session.  Serene demonstrated good  understanding  Patient: Amarilis Fernandez MRN: [id number]  SSN: [id number]    YOB: 1943  Age: 69 y.o. Sex: female        Subjective:     Chief Complaint   Patient presents with    Foot Swelling    Leg Swelling       HPI: she is a 69y.o. year old female who presents with multiple complaints. She has worsening pedal edema and stasis dermatitis. Patient with hx of hydradenitis with draining lesions. She has been referred to surgeon. Patient denies HA, dizziness, SOB, CP, abdominal pain, acute myalgias or arthralgias. She asks for refills of all medications. She is due for lab work. Encounter Diagnoses   Name Primary? 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 Current and past medical information:    Current Medications after this visit[de-identified]     Current Outpatient Prescriptions   Medication Sig    furosemide (LASIX) 40 mg tablet Take 2 Tabs by mouth two (2) times a day.  atorvastatin (LIPITOR) 80 mg tablet Take 1 Tab by mouth daily.  metFORMIN (GLUCOPHAGE) 500 mg tablet Take 1 Tab by mouth two (2) times daily (with meals).  apixaban (ELIQUIS) 5 mg tablet Take 1 Tab by mouth every twelve (12) hours.  oxybutynin (DITROPAN) 5 mg tablet Take 1 Tab by mouth three (3) times daily.     gabapentin (NEURONTIN) 100 mg capsule Take 1 Cap by mouth three (3) of the education provided.     See EMR under Patient Instructions for exercises provided 10/27/2022.    Assessment     Pt agreeable to d/c this visit and reports she will likely get a knee scope at this point. We discussed the pro's/con's of this surgery and what she can expect with recovery. Pt educated she will need to adopt exercise long term for best results following surgery. Pt verbalized understanding. Goals met adjusted below.     Goals:  Short Term Goals: (4 weeks)  1. Pt will be independent with HEP in order to supplement patient in improving functional mobility. -  met  2. Pt will improve (L) knee AROM to at least 0-120 in order to improve gait and ability to perform ADL's -  not met  3. Pt will have a normalized gait pattern with no pain. -  not met     Long Term Goals: (8 weeks)  1. Pt will be independent with updated HEP supplement PT in improving functional mobility. -  met  2. Pt will improve FOTO knee survey score to </= 39 % limited in order to demo improved functional mobility. - not met  3. Pt will return to work with MD clearance with 0/10 pain- met  4. Pt will perform at least 10s of SLB to demonstrate improved tolerance to SL loading. -  met     Plan   D/c       Smith Owusu, PT                            times daily as needed.  HUMIRA PEN 40 mg/0.8 mL injection pen     mupirocin (BACTROBAN) 2 % ointment Apply  to affected area daily.  clindamycin (CLEOCIN T) 1 % lotion Apply to effected area twixe a day    loratadine (CLARITIN) 10 mg tablet Take 1 Tab by mouth daily.  fluticasone (FLONASE) 50 mcg/actuation nasal spray 2 Sprays by Both Nostrils route daily.  cholecalciferol (VITAMIN D3) 1,000 unit cap Take 2,000 Units by mouth daily. No current facility-administered medications for this visit. Patient Active Problem List    Diagnosis Date Noted    Recurrent depression ([facility] [address] 01/11/2018    Advanced care planning/counseling discussion 06/13/2016    H/O Pulmonary embolism (1/2015) 01/05/2015    Edema 08/27/2014    Hydradenitis 02/01/2012    HTN (hypertension) 01/21/2011    AR (allergic rhinitis) 05/21/2010    Eczema 05/21/2010    Depression 05/21/2010    Female stress incontinence 05/21/2010    Dyslipidemia 05/21/2010       Past Medical History:   Diagnosis Date    Arthritis     Diabetes ([facility] [address]     states she is \"pre-diabetic\", diet controlled    frequency     H/O blood clots     right side of body    Heart attack 1/2014    Hydradenitis 2/1/2012    Hypercholesterolemia     Hypertension     Ill-defined condition     edema of legs       Allergies   Allergen Reactions    Ciprofloxacin Other (comments)     Vomiting    Doxycycline Other (comments)     Vomiting    Pcn [Penicillins] Rash       History reviewed. No pertinent surgical history.     Social History     Social History    Marital status: LEGALLY      Spouse name: N/A    Number of children: N/A    Years of education: N/A     Social History Main Topics    Smoking status: Never Smoker    Smokeless tobacco: Never Used    Alcohol use No    Drug use: No    Sexual activity: No     Other Topics Concern    None     Social History Narrative         Objective:     Review of Systems:  Constitutional: Negative for fatigue or malaise  Derm: see HPI  HEENT: Negative for acute hearing or vision changes  Cardiovascular: Negative for dizziness, chest pain or palpitations  Respiratory: Negative for cough, wheezing or SOB  Gastreintestinal:  Negative for nausea or abdominal pain  Genital/urinary: Negative for dysuria   Muscoloskeletal: Negative for acute myalgias or arthralgias   Neurological: Negative for headache, weakness or paresthesia  Psychological: Negative for depression or anxiety      Vitals:    03/02/18 1444   BP: 120/66   Pulse: 73   Resp: 20   Temp: 98.4 °F (36.9 °C)   TempSrc: Oral   SpO2: 99%   Weight: 161 lb 3.2 oz (73.1 kg)   Height: 5' (1.524 m)      Body mass index is 31.48 kg/(m^2). Physical Exam:  Constitutional: well developed, well nourished, in no acute distress  Skin: b/l LE stasis dermatitis   Head: normocephalic, atraumatic  Eyes: sclera clear, EOMI, PERRL  Neck: normal range of motion  Cardiovascular: normal S1, S2, regular rate and rhythm  Respiratory: clear to auscultation bilaterally with symmetrical effort  Extremities: full range of motion, 3+ pedal edema  Neurology: normal strength and sensation  Psych: active, alert and oriented, affect appropriate       Health Maintenance Due   Topic Date Due    FOBT Q 1 YEAR AGE 50-75  03/05/1995    GLAUCOMA SCREENING Q2Y  03/05/2010       Risk, benefits and potential costs of recommended health maintenance discussed. Patient expressed understanding and declined at this time. Assessment and orders:       ICD-10-CM ICD-9-CM    1. Essential hypertension I10 401.9 furosemide (LASIX) 40 mg tablet   2. Pedal edema R60.0 782.3 furosemide (LASIX) 40 mg tablet   3. Venous stasis dermatitis of both lower extremities I87.2 454.1    4. Hyperlipidemia, unspecified hyperlipidemia type E78.5 272.4 atorvastatin (LIPITOR) 80 mg tablet   5. Other chronic pulmonary embolism without acute cor pulmonale (HCC) I27.82 416.2 apixaban (ELIQUIS) 5 mg tablet   6.  Female stress incontinence N39.3 625.6 oxybutynin (DITROPAN) 5 mg tablet   7. 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 Neuropathy due to type 2 diabetes mellitus (HCC) E11.40 250.60 gabapentin (NEURONTIN) 100 mg capsule     357.2          Plan of care:  Diagnoses were discussed in detail with patient. Medication risks/benefits/side effects discussed with patient. All of the patient's questions were addressed and answered to apparent satisfaction. The patient understands and agrees with our plan of care. The patient knows to call back if they have questions about the plan of care or if symptoms change. The patient received an After-Visit Summary which contains VS, diagnoses, orders, allergy and medication lists. Patient Care Team:  Esequiel De Leon MD as PCP - General (Family Practice)  Yolanda Piña MD (Cardiology)  Benny Motley DPM (Podiatry)  Josey Nelson MD (General Surgery)  Ubaldo Gómez OD (Optometry)    Follow-up Disposition:  Return in about 4 days (around 3/6/2018), or if symptoms worsen or fail to improve.     Future Appointments  Date Time Provider Buddy Boles   3/7/2018 11:00 AM Aracelis Morales MD [facility]       Signed By: Aracelis Morales MD     March 2, 2018

## 2023-01-06 NOTE — TELEPHONE ENCOUNTER
----- Message from Keagan Haque MA sent at 1/5/2023  1:49 PM CST -----  Contact: patient  Patient would like to reschedule her appt she cancelled on 1/3/23.    Call back number is [phone]

## 2023-01-13 ENCOUNTER — OFFICE VISIT (OUTPATIENT)
Dept: ORTHOPEDICS | Facility: CLINIC | Age: 53
End: 2023-01-13
Payer: MEDICAID

## 2023-01-13 VITALS — WEIGHT: 242 LBS | HEIGHT: 65 IN | BODY MASS INDEX: 40.32 KG/M2

## 2023-01-13 DIAGNOSIS — M17.12 ARTHRITIS OF LEFT KNEE: ICD-10-CM

## 2023-01-13 DIAGNOSIS — M22.42 CHONDROMALACIA OF LEFT PATELLA: ICD-10-CM

## 2023-01-13 DIAGNOSIS — M94.269 CHONDROMALACIA OF KNEE: ICD-10-CM

## 2023-01-13 DIAGNOSIS — S83.282A ACUTE LATERAL MENISCUS TEAR OF LEFT KNEE, INITIAL ENCOUNTER: Primary | ICD-10-CM

## 2023-01-13 DIAGNOSIS — Z01.818 PRE-OP TESTING: ICD-10-CM

## 2023-01-13 PROCEDURE — 99214 OFFICE O/P EST MOD 30 MIN: CPT | Mod: S$PBB,,, | Performed by: ORTHOPAEDIC SURGERY

## 2023-01-13 PROCEDURE — 99212 OFFICE O/P EST SF 10 MIN: CPT | Mod: PBBFAC,PN | Performed by: ORTHOPAEDIC SURGERY

## 2023-01-13 PROCEDURE — 99214 PR OFFICE/OUTPT VISIT, EST, LEVL IV, 30-39 MIN: ICD-10-PCS | Mod: S$PBB,,, | Performed by: ORTHOPAEDIC SURGERY

## 2023-01-13 PROCEDURE — 3008F BODY MASS INDEX DOCD: CPT | Mod: CPTII,,, | Performed by: ORTHOPAEDIC SURGERY

## 2023-01-13 PROCEDURE — 3008F PR BODY MASS INDEX (BMI) DOCUMENTED: ICD-10-PCS | Mod: CPTII,,, | Performed by: ORTHOPAEDIC SURGERY

## 2023-01-13 PROCEDURE — 99999 PR PBB SHADOW E&M-EST. PATIENT-LVL II: CPT | Mod: PBBFAC,,, | Performed by: ORTHOPAEDIC SURGERY

## 2023-01-13 PROCEDURE — 99999 PR PBB SHADOW E&M-EST. PATIENT-LVL II: ICD-10-PCS | Mod: PBBFAC,,, | Performed by: ORTHOPAEDIC SURGERY

## 2023-01-13 RX ORDER — MUPIROCIN 20 MG/G
OINTMENT TOPICAL
Status: CANCELLED | OUTPATIENT
Start: 2023-01-13

## 2023-01-13 NOTE — PROGRESS NOTES
Patient ID: Serene Davis is a 52 y.o. female    Chief Complaint:   Chief Complaint   Patient presents with    Left Knee - Post-op Evaluation       History of Present Illness:    Serene Davis is a pleasant 52 y.o. female who presents for evaluation of left knee pain. She  reports pain for the past 2 weeks. She does endorse a history of trauma.  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  Bending and straightening makes it worse and rest makes it better. The pain is mostly in front of the knee and occasionally lateral. She does endorse nighttime pain.   She is independent with all activities of daily living.     In the past she has tried the following treatments:    NSAIDS     She currently does work.   Occupation:  Works at a assisted living facility    Instability: No  Mechanical Symptoms: Yes    Diabetic:  No  Smoker:  No      ____________________________________________________________________    Interval history 01/13/2023 : Patient returns today for re-evaluation of her left knee.  She is being treated non operatively for a left knee lateral meniscus tear and LCL strain.  She reports she is doing significantly better however still complaining of difficulty straightening her knee fully as well as some instability.      PAST MEDICAL HISTORY:   Past Medical History:   Diagnosis Date    Graves disease      PAST SURGICAL HISTORY: History reviewed. No pertinent surgical history.  FAMILY HISTORY: History reviewed. No pertinent family history.  SOCIAL HISTORY:   Social History     Occupational History    Not on file   Tobacco Use    Smoking status: Not on file    Smokeless tobacco: Not on file   Substance and Sexual Activity    Alcohol use: Not on file    Drug use: Not on file    Sexual activity: Not on file        MEDICATIONS:   Current Outpatient Medications:     etodolac (LODINE) 300 MG Cap, Take 1 capsule (300 mg  total) by mouth 2 (two) times daily., Disp: 30 capsule, Rfl: 0    gabapentin (NEURONTIN) 800 MG tablet, Take 800 mg by mouth once daily., Disp: , Rfl:     levothyroxine (SYNTHROID) 100 MCG tablet, Take 100 mcg by mouth before breakfast., Disp: , Rfl:     tirzepatide (MOUNJARO) 2.5 mg/0.5 mL PnIj, Inject 2.5 mg into the skin., Disp: , Rfl:   ALLERGIES:   Review of patient's allergies indicates:   Allergen Reactions    Adhesive          Physical Exam     There were no vitals filed for this visit.      Alert and oriented to person, place and time. No acute distress. Well-groomed, not ill appearing. Pupils round and reactive, normal respiratory effort, no audible wheezing.     GENERAL:  A well-developed, well-nourished 52 y.o. female who is alert and       oriented in no acute distress.      Gait: She  walks with a antalgic gait.                   EXTREMITIES:  Examination of lower extremities reveals there is no visible mass or deformity.    Left knee:  ROM 0-115.  Pain with terminal extension    Ligamentously stable to varus/valgus stress.    Anterior and posterior drawers negative.    No pain over pes bursa.    No warmth    No erythema     Effusion Yes, but improved    lateral joint line tenderness    Positive Patellofemoral grind/crepitus         The skin over both lower extremities is normal and unremarkable.  She has a  painless range of motion of the hips and ankles bilaterally.   Sensation is intact in both lower extremities.    There are no motor deficits in the lower extremities bilaterally.   Pedal pulses are palpable distally bilaterally.    She has no calf tenderness to palpation nor edema.         Imagin view  standing left knee X-rays ordered/reviewed by me showing no evidence of fracture or dislocation. There is no obvious malalignment. No evidence of masses, lesions or foreign bodies.  Mild to moderate degenerative changes, worse on the right knee.  Bone spur on the superior pole patella.    MRI  left knee without contrast:  Interstitial tearing of the LCL.  Signal change in the lateral meniscus with likely body tear.  There is mild to moderate tricompartmental arthritic changes.  Patellofemoral arthritis.    Assessment & Plan    Acute lateral meniscus tear of left knee, initial encounter    Arthritis of left knee        Treatment options discussed with her.  She has persistent left knee pain and instability and difficulty with full extension.  This is all secondary to her traumatic injury about 2-3 months ago.  Prior to this she had no knee pain.  My recommendation at this time included continued nonoperative treatment as well as surgery.  She would like to proceed with surgery due to persistent difficulty with extension as well as instability.  Likely secondary to lateral meniscus tear and loose cartilage.    _________________________________________________________________      Diagnosis and findings were discussed with her in lay terms. I discussed the findings and treatment options with them at length. Questions were actively sought and all questions were answered to their apparent satisfaction. We discussed the risks and benefits of surgery. We reviewed the operative indications surgical technique and potential rehabilitation involved. Risks including, but not limited to pain, bleeding, infection, damage surrounding neurovascular structures, and other soft tissues, decreased range of motion, instability, poor cosmetic result, scarring/painful scars, poor functional result, reflex sympathetic dystrophy. We discussed as well the risk of anesthesia, DVT/PE and possible need for additional surgical intervention in lay terms. Despite the risks as explained to them, they wished to proceed with surgery. She expressed understanding and agreement with the treatment plan and understand that no guarantee of outcome is implied or expressed given.       Sereneharika Hookscelsobrandi Ryan will be scheduled for the following  procedure(s):     Left knee arthroscopy with lateral meniscus debridement  Chondroplasty left knee      Post-Op Medications to be prescribed:   Percocet 5/325mg Take 1-2 tablets every 4-6 hours PRN pain #28  Zofran 4mg oral disintegrating tablets every 8 hours PRN nausea/vomiting   ASA 81mg daily x 2 weeks  Motrin 600 mg TID PRN     DME/Bracing:  None  Post-op Physical Therapy to begin: immediately post-op    Medical Clearance: Yes, history of thyroid disease  Hx of DVT,PE, anesthetic complications: No    Additional notes/concerns:  None    Opioid Disclosure  Today we discussed the reasons why the prescription is necessary as well as: the risks of addiction and overdose associated with opioid drugs and the dangers of taking opioid drugs with alcohol, benzodiazepines and other central nervous system depressants; alternative treatments that may be available; the risks associated with the use of the drugs being prescribed, specifically that opioids are highly addictive, even when taken as prescribed, that there is a risk of developing a physical or psychological dependence on the controlled dangerous substance, and that the risks of taking more opioids than prescribed or mixing sedatives, benzodiazepines or alcohol with opioids can result in fatal respiratory depression.

## 2023-01-26 ENCOUNTER — HOSPITAL ENCOUNTER (OUTPATIENT)
Dept: PREADMISSION TESTING | Facility: HOSPITAL | Age: 53
Discharge: HOME | End: 2023-01-26
Attending: ORTHOPAEDIC SURGERY
Payer: MEDICAID

## 2023-01-26 ENCOUNTER — HOSPITAL ENCOUNTER (OUTPATIENT)
Dept: RADIOLOGY | Facility: HOSPITAL | Age: 53
Discharge: HOME | End: 2023-01-26
Attending: ORTHOPAEDIC SURGERY
Payer: MEDICAID

## 2023-01-26 VITALS — WEIGHT: 226 LBS | BODY MASS INDEX: 37.65 KG/M2 | HEIGHT: 65 IN

## 2023-01-26 DIAGNOSIS — S83.282A ACUTE LATERAL MENISCUS TEAR OF LEFT KNEE, INITIAL ENCOUNTER: ICD-10-CM

## 2023-01-26 DIAGNOSIS — Z01.818 PRE-OP EXAM: ICD-10-CM

## 2023-01-26 DIAGNOSIS — Z01.818 PRE-OP TESTING: ICD-10-CM

## 2023-01-26 DIAGNOSIS — M22.42 CHONDROMALACIA OF LEFT PATELLA: ICD-10-CM

## 2023-01-26 LAB
ANION GAP SERPL CALC-SCNC: 8 MMOL/L (ref 8–16)
BASOPHILS # BLD AUTO: 0.02 K/UL (ref 0–0.2)
BASOPHILS NFR BLD: 0.3 % (ref 0–1.9)
BUN SERPL-MCNC: 12 MG/DL (ref 6–20)
CALCIUM SERPL-MCNC: 10.3 MG/DL (ref 8.7–10.5)
CHLORIDE SERPL-SCNC: 103 MMOL/L (ref 95–110)
CO2 SERPL-SCNC: 27 MMOL/L (ref 23–29)
CREAT SERPL-MCNC: 0.9 MG/DL (ref 0.5–1.4)
DIFFERENTIAL METHOD: ABNORMAL
EOSINOPHIL # BLD AUTO: 0.1 K/UL (ref 0–0.5)
EOSINOPHIL NFR BLD: 1 % (ref 0–8)
ERYTHROCYTE [DISTWIDTH] IN BLOOD BY AUTOMATED COUNT: 14.1 % (ref 11.5–14.5)
EST. GFR  (NO RACE VARIABLE): >60 ML/MIN/1.73 M^2
GLUCOSE SERPL-MCNC: 81 MG/DL (ref 70–110)
HCT VFR BLD AUTO: 42.8 % (ref 37–48.5)
HGB BLD-MCNC: 13.6 G/DL (ref 12–16)
IMM GRANULOCYTES # BLD AUTO: 0.04 K/UL (ref 0–0.04)
IMM GRANULOCYTES NFR BLD AUTO: 0.6 % (ref 0–0.5)
LYMPHOCYTES # BLD AUTO: 1.3 K/UL (ref 1–4.8)
LYMPHOCYTES NFR BLD: 18.9 % (ref 18–48)
MCH RBC QN AUTO: 27.5 PG (ref 27–31)
MCHC RBC AUTO-ENTMCNC: 31.8 G/DL (ref 32–36)
MCV RBC AUTO: 87 FL (ref 82–98)
MONOCYTES # BLD AUTO: 0.5 K/UL (ref 0.3–1)
MONOCYTES NFR BLD: 7.7 % (ref 4–15)
NEUTROPHILS # BLD AUTO: 5 K/UL (ref 1.8–7.7)
NEUTROPHILS NFR BLD: 71.5 % (ref 38–73)
NRBC BLD-RTO: 0 /100 WBC
PLATELET # BLD AUTO: 246 K/UL (ref 150–450)
PMV BLD AUTO: 12.6 FL (ref 9.2–12.9)
POTASSIUM SERPL-SCNC: 4.5 MMOL/L (ref 3.5–5.1)
RBC # BLD AUTO: 4.95 M/UL (ref 4–5.4)
SODIUM SERPL-SCNC: 138 MMOL/L (ref 136–145)
WBC # BLD AUTO: 6.97 K/UL (ref 3.9–12.7)

## 2023-01-26 PROCEDURE — 99900103 DSU ONLY-NO CHARGE-INITIAL HR (STAT)

## 2023-01-26 PROCEDURE — 71046 X-RAY EXAM CHEST 2 VIEWS: CPT | Mod: TC,FY

## 2023-01-26 PROCEDURE — 93010 EKG 12-LEAD: ICD-10-PCS | Mod: ,,, | Performed by: INTERNAL MEDICINE

## 2023-01-26 PROCEDURE — 85025 COMPLETE CBC W/AUTO DIFF WBC: CPT | Performed by: ORTHOPAEDIC SURGERY

## 2023-01-26 PROCEDURE — 80048 BASIC METABOLIC PNL TOTAL CA: CPT | Performed by: ORTHOPAEDIC SURGERY

## 2023-01-26 PROCEDURE — 99900104 DSU ONLY-NO CHARGE-EA ADD'L HR (STAT)

## 2023-01-26 PROCEDURE — 93010 ELECTROCARDIOGRAM REPORT: CPT | Mod: ,,, | Performed by: INTERNAL MEDICINE

## 2023-01-26 PROCEDURE — 71046 X-RAY EXAM CHEST 2 VIEWS: CPT | Mod: 26,,, | Performed by: RADIOLOGY

## 2023-01-26 PROCEDURE — 71046 XR CHEST PA AND LATERAL: ICD-10-PCS | Mod: 26,,, | Performed by: RADIOLOGY

## 2023-01-26 PROCEDURE — 93005 ELECTROCARDIOGRAM TRACING: CPT

## 2023-01-26 PROCEDURE — 36415 COLL VENOUS BLD VENIPUNCTURE: CPT | Performed by: ORTHOPAEDIC SURGERY

## 2023-01-26 NOTE — DISCHARGE INSTRUCTIONS
To confirm, Your doctor has instructed you that surgery is scheduled for: 2/9/23    Please report to [facility], Registration the morning of surgery. You must check-in and receive a wristband before going to your procedure.    Pre-Op will call the afternoon prior to surgery between 1:00 and 6:00 PM with the final arrival time.  Phone number: [phone]    PLEASE NOTE:  The surgery schedule has many variables which may affect the time of your surgery case.  Family members should be available if your surgery time changes.  Plan to be here the day of your procedure between 4-6 hours.    MEDICATIONS:  TAKE ONLY THESE MEDICATIONS WITH A SMALL SIP OF WATER THE MORNING OF YOUR PROCEDURE:    SEE MED LIST        DO NOT TAKE THESE MEDICATIONS 5-7 DAYS PRIOR to your procedure or per your surgeon's request:   ASPIRIN, ALEVE, ADVIL, IBUPROFEN, FISH OIL VITAMIN E, HERBALS  (May take Tylenol)    ONLY if you are prescribed any types of blood thinners such as:  Aspirin, Coumadin, Plavix, Pradaxa, Xarelto, Aggrenox, Effient, Eliquis, Savasya, Brilinta, or any other, ask your surgeon whether you should stop taking them and how long before surgery you should stop.  You may also need to verify with the prescribing physician if it is ok to stop your medication.      INSTRUCTIONS IMPORTANT!!  Do not eat or drink anything between midnight and the time of your procedure- this includes gum, mints, and candy.  Do not smoke or drink alcoholic beverages 24 hours prior to your procedure.  Shower the night before AND the morning of your procedure with a Chlorhexidine wash such as Hibiclens or Dial antibacterial soap from the neck down.  Do not get it on your face or in your eyes.  You may use your own shampoo and face wash. This helps your skin to be as bacteria free as possible.    If you wear contact lenses, dentures, hearing aids or glasses, bring a container to put them in during surgery and give to a family member  for safe keeping.  Please leave all jewelry, piercing's and valuables at home.   DO NOT remove hair from the surgery site.  Do not shave the incision site unless you are given specific instructions to do so.    ONLY if you have been diagnosed with sleep apnea please bring your C-PAP machine.  ONLY if you wear home oxygen please bring your portable oxygen tank the day of your procedure.  ONLY if you have a history of OPEN HEART SURGERY you will need a clearance from your Cardiologist per Anesthesia.      ONLY for patients requiring bowel prep, written instructions will be given by your doctor's office.  ONLY if you have a neuro stimulator, please bring the controller with you the morning of surgery  ONLY if a type and screen test is needed before surgery, please return:  If your doctor has scheduled you for an overnight stay, bring a small overnight bag with any personal items you need.  Make arrangements in advance for transportation home by a responsible adult.  It is not safe to drive a vehicle during the 24 hours after anesthesia.      [facility] Visitor Policy    Effective September 26, 2022    [facility] will resume routine visitation for COVID-19 negative patients, including inpatients, outpatients, and procedural areas, in accordance with local campus procedures.    All [facility] facilities and properties are tobacco free.  Smoking is NOT allowed.   If you have any questions about these instructions, call Pre-Op Admit  Nursing at [phone] or the Pre-Op Day Surgery Unit at [phone].

## 2023-01-27 NOTE — OR NURSING
Camila never answered Secure Chat, but was noted to see comment (copy in chart). Upon further investigation, noted Dr. Martines had reviewed CXR and no new further orders noted.

## 2023-01-27 NOTE — OR NURSING
Notified Dr. Martines's RN, Camila, to let Dr. Martines know to please review patient's CXR, as it was not WNL.

## 2023-02-08 ENCOUNTER — ANESTHESIA EVENT (OUTPATIENT)
Dept: SURGERY | Facility: HOSPITAL | Age: 53
End: 2023-02-08
Payer: COMMERCIAL

## 2023-02-08 DIAGNOSIS — S83.282A ACUTE LATERAL MENISCUS TEAR OF LEFT KNEE, INITIAL ENCOUNTER: Primary | ICD-10-CM

## 2023-02-08 RX ORDER — ASPIRIN 81 MG/1
81 TABLET ORAL DAILY
Qty: 14 TABLET | Refills: 0 | Status: ON HOLD | OUTPATIENT
Start: 2023-02-08 | End: 2023-02-15 | Stop reason: HOSPADM

## 2023-02-08 RX ORDER — OXYCODONE AND ACETAMINOPHEN 5; 325 MG/1; MG/1
1 TABLET ORAL EVERY 6 HOURS PRN
Qty: 28 TABLET | Refills: 0 | Status: ON HOLD | OUTPATIENT
Start: 2023-02-08 | End: 2023-02-15 | Stop reason: HOSPADM

## 2023-02-08 RX ORDER — CYCLOBENZAPRINE HCL 10 MG
10 TABLET ORAL 3 TIMES DAILY PRN
Qty: 30 TABLET | Refills: 0 | Status: SHIPPED | OUTPATIENT
Start: 2023-02-08 | End: 2023-02-18

## 2023-02-08 RX ORDER — ONDANSETRON 4 MG/1
4 TABLET, ORALLY DISINTEGRATING ORAL EVERY 8 HOURS PRN
Qty: 28 TABLET | Refills: 0 | Status: ON HOLD | OUTPATIENT
Start: 2023-02-08 | End: 2023-02-15 | Stop reason: HOSPADM

## 2023-02-08 RX ORDER — IBUPROFEN 600 MG/1
600 TABLET ORAL EVERY 6 HOURS PRN
Qty: 90 TABLET | Refills: 0 | Status: ON HOLD | OUTPATIENT
Start: 2023-02-08 | End: 2023-02-15 | Stop reason: HOSPADM

## 2023-02-08 NOTE — TELEPHONE ENCOUNTER
----- Message from Casimiro Ramos sent at 2/8/2023 12:18 PM CST -----  Regarding: needs to speak to staff about Sx tomorrow, [phone]  Contact: pt   needs to speak to staff about Sx tomorrow, [phone]

## 2023-02-09 ENCOUNTER — ANESTHESIA (OUTPATIENT)
Dept: SURGERY | Facility: HOSPITAL | Age: 53
End: 2023-02-09
Payer: COMMERCIAL

## 2023-02-13 NOTE — PRE-PROCEDURE INSTRUCTIONS
Patient was a r/s from January.  Called to go over pre-op instructions.  No answer left instructions on voicemail with number to call back with questions.  Also sent through [facility].

## 2023-02-15 ENCOUNTER — HOSPITAL ENCOUNTER (OUTPATIENT)
Facility: HOSPITAL | Age: 53
Discharge: HOME | End: 2023-02-15
Attending: ORTHOPAEDIC SURGERY | Admitting: ORTHOPAEDIC SURGERY
Payer: COMMERCIAL

## 2023-02-15 DIAGNOSIS — S83.282A ACUTE LATERAL MENISCUS TEAR OF LEFT KNEE, INITIAL ENCOUNTER: ICD-10-CM

## 2023-02-15 DIAGNOSIS — Z01.818 PRE-OP TESTING: ICD-10-CM

## 2023-02-15 DIAGNOSIS — M94.269 CHONDROMALACIA OF KNEE: ICD-10-CM

## 2023-02-15 DIAGNOSIS — M22.42 CHONDROMALACIA OF LEFT PATELLA: ICD-10-CM

## 2023-02-15 PROCEDURE — 25000003 PHARM REV CODE 250: Performed by: NURSE ANESTHETIST, CERTIFIED REGISTERED

## 2023-02-15 PROCEDURE — 99900104 DSU ONLY-NO CHARGE-EA ADD'L HR (STAT): Performed by: ORTHOPAEDIC SURGERY

## 2023-02-15 PROCEDURE — 76942 ECHO GUIDE FOR BIOPSY: CPT | Performed by: ANESTHESIOLOGY

## 2023-02-15 PROCEDURE — 64447 PERIPHERAL BLOCK: ICD-10-PCS | Mod: 59,LT,, | Performed by: ANESTHESIOLOGY

## 2023-02-15 PROCEDURE — 37000009 HC ANESTHESIA EA ADD 15 MINS: Performed by: ORTHOPAEDIC SURGERY

## 2023-02-15 PROCEDURE — 99900103 DSU ONLY-NO CHARGE-INITIAL HR (STAT): Performed by: ORTHOPAEDIC SURGERY

## 2023-02-15 PROCEDURE — 64447 NJX AA&/STRD FEMORAL NRV IMG: CPT | Mod: 59,LT,, | Performed by: ANESTHESIOLOGY

## 2023-02-15 PROCEDURE — 71000033 HC RECOVERY, INTIAL HOUR: Performed by: ORTHOPAEDIC SURGERY

## 2023-02-15 PROCEDURE — D9220A PRA ANESTHESIA: Mod: ANES,,, | Performed by: ANESTHESIOLOGY

## 2023-02-15 PROCEDURE — 37000008 HC ANESTHESIA 1ST 15 MINUTES: Performed by: ORTHOPAEDIC SURGERY

## 2023-02-15 PROCEDURE — 29879 ARTHRS KNE SRG ABRASJ ARTHRP: CPT | Mod: LT,,, | Performed by: ORTHOPAEDIC SURGERY

## 2023-02-15 PROCEDURE — 63600175 PHARM REV CODE 636 W HCPCS: Performed by: ANESTHESIOLOGY

## 2023-02-15 PROCEDURE — 29879 PR KNEE SCOPE,ABRASN ARTHROPLASTY: ICD-10-PCS | Mod: LT,,, | Performed by: ORTHOPAEDIC SURGERY

## 2023-02-15 PROCEDURE — 27200651 HC AIRWAY, LMA: Performed by: ANESTHESIOLOGY

## 2023-02-15 PROCEDURE — 36000710: Performed by: ORTHOPAEDIC SURGERY

## 2023-02-15 PROCEDURE — 25000003 PHARM REV CODE 250: Performed by: ANESTHESIOLOGY

## 2023-02-15 PROCEDURE — 94799 UNLISTED PULMONARY SVC/PX: CPT

## 2023-02-15 PROCEDURE — 71000016 HC POSTOP RECOV ADDL HR: Performed by: ORTHOPAEDIC SURGERY

## 2023-02-15 PROCEDURE — C9290 INJ, BUPIVACAINE LIPOSOME: HCPCS | Performed by: ANESTHESIOLOGY

## 2023-02-15 PROCEDURE — 29881 PR KNEE SCOPE SINGLE MENISECECTOMY: ICD-10-PCS | Mod: 51,LT,, | Performed by: ORTHOPAEDIC SURGERY

## 2023-02-15 PROCEDURE — 63600175 PHARM REV CODE 636 W HCPCS

## 2023-02-15 PROCEDURE — 25000003 PHARM REV CODE 250: Performed by: ORTHOPAEDIC SURGERY

## 2023-02-15 PROCEDURE — 36000711: Performed by: ORTHOPAEDIC SURGERY

## 2023-02-15 PROCEDURE — 71000039 HC RECOVERY, EACH ADD'L HOUR: Performed by: ORTHOPAEDIC SURGERY

## 2023-02-15 PROCEDURE — 71000015 HC POSTOP RECOV 1ST HR: Performed by: ORTHOPAEDIC SURGERY

## 2023-02-15 PROCEDURE — 63600175 PHARM REV CODE 636 W HCPCS: Performed by: NURSE ANESTHETIST, CERTIFIED REGISTERED

## 2023-02-15 PROCEDURE — D9220A PRA ANESTHESIA: Mod: CRNA,,, | Performed by: NURSE ANESTHETIST, CERTIFIED REGISTERED

## 2023-02-15 PROCEDURE — 29881 ARTHRS KNE SRG MNISECTMY M/L: CPT | Mod: 51,LT,, | Performed by: ORTHOPAEDIC SURGERY

## 2023-02-15 PROCEDURE — D9220A PRA ANESTHESIA: ICD-10-PCS | Mod: ANES,,, | Performed by: ANESTHESIOLOGY

## 2023-02-15 PROCEDURE — 27200750 HC INSULATED NEEDLE/ STIMUPLEX: Performed by: ANESTHESIOLOGY

## 2023-02-15 PROCEDURE — 63600175 PHARM REV CODE 636 W HCPCS: Performed by: ORTHOPAEDIC SURGERY

## 2023-02-15 PROCEDURE — D9220A PRA ANESTHESIA: ICD-10-PCS | Mod: CRNA,,, | Performed by: NURSE ANESTHETIST, CERTIFIED REGISTERED

## 2023-02-15 PROCEDURE — 27201423 OPTIME MED/SURG SUP & DEVICES STERILE SUPPLY: Performed by: ORTHOPAEDIC SURGERY

## 2023-02-15 RX ORDER — LIDOCAINE HYDROCHLORIDE 10 MG/ML
1 INJECTION, SOLUTION EPIDURAL; INFILTRATION; INTRACAUDAL; PERINEURAL ONCE
Status: COMPLETED | OUTPATIENT
Start: 2023-02-15 | End: 2023-02-15

## 2023-02-15 RX ORDER — ONDANSETRON HYDROCHLORIDE 2 MG/ML
INJECTION, SOLUTION INTRAMUSCULAR; INTRAVENOUS
Status: DISCONTINUED | OUTPATIENT
Start: 2023-02-15 | End: 2023-02-15

## 2023-02-15 RX ORDER — PROCHLORPERAZINE EDISYLATE 5 MG/ML
5 INJECTION INTRAMUSCULAR; INTRAVENOUS EVERY 30 MIN PRN
Status: DISPENSED | OUTPATIENT
Start: 2023-02-15

## 2023-02-15 RX ORDER — MIDAZOLAM HYDROCHLORIDE 1 MG/ML
INJECTION INTRAMUSCULAR; INTRAVENOUS
Status: DISCONTINUED | OUTPATIENT
Start: 2023-02-15 | End: 2023-02-15

## 2023-02-15 RX ORDER — LIDOCAINE HCL/PF 100 MG/5ML
SYRINGE (ML) INTRAVENOUS
Status: DISCONTINUED | OUTPATIENT
Start: 2023-02-15 | End: 2023-02-15

## 2023-02-15 RX ORDER — CEFAZOLIN SODIUM 2 G/50ML
2 SOLUTION INTRAVENOUS
Status: COMPLETED | OUTPATIENT
Start: 2023-02-15 | End: 2023-02-15

## 2023-02-15 RX ORDER — MUPIROCIN 20 MG/G
OINTMENT TOPICAL
Status: DISCONTINUED | OUTPATIENT
Start: 2023-02-15 | End: 2023-02-15 | Stop reason: HOSPADM

## 2023-02-15 RX ORDER — HYDROCODONE BITARTRATE AND ACETAMINOPHEN 5; 325 MG/1; MG/1
1 TABLET ORAL EVERY 4 HOURS PRN
Status: CANCELLED | OUTPATIENT
Start: 2023-02-15

## 2023-02-15 RX ORDER — DEXAMETHASONE SODIUM PHOSPHATE 4 MG/ML
INJECTION, SOLUTION INTRA-ARTICULAR; INTRALESIONAL; INTRAMUSCULAR; INTRAVENOUS; SOFT TISSUE
Status: DISCONTINUED | OUTPATIENT
Start: 2023-02-15 | End: 2023-02-15

## 2023-02-15 RX ORDER — IBUPROFEN 600 MG/1
600 TABLET ORAL EVERY 6 HOURS PRN
Status: CANCELLED | OUTPATIENT
Start: 2023-02-15

## 2023-02-15 RX ORDER — ASPIRIN 81 MG/1
81 TABLET ORAL DAILY
Qty: 14 TABLET | Refills: 0 | Status: SHIPPED | OUTPATIENT
Start: 2023-02-15 | End: 2023-03-01

## 2023-02-15 RX ORDER — PROCHLORPERAZINE EDISYLATE 5 MG/ML
5 INJECTION INTRAMUSCULAR; INTRAVENOUS EVERY 6 HOURS PRN
Status: CANCELLED | OUTPATIENT
Start: 2023-02-15

## 2023-02-15 RX ORDER — OXYCODONE HYDROCHLORIDE 10 MG/1
10 TABLET ORAL EVERY 4 HOURS PRN
Status: CANCELLED | OUTPATIENT
Start: 2023-02-15

## 2023-02-15 RX ORDER — FENTANYL CITRATE 50 UG/ML
25 INJECTION, SOLUTION INTRAMUSCULAR; INTRAVENOUS EVERY 5 MIN PRN
Status: DISPENSED | OUTPATIENT
Start: 2023-02-15

## 2023-02-15 RX ORDER — DIPHENHYDRAMINE HYDROCHLORIDE 50 MG/ML
12.5 INJECTION INTRAMUSCULAR; INTRAVENOUS ONCE AS NEEDED
Status: ACTIVE | OUTPATIENT
Start: 2023-02-15 | End: 2034-07-14

## 2023-02-15 RX ORDER — SODIUM CHLORIDE, SODIUM LACTATE, POTASSIUM CHLORIDE, CALCIUM CHLORIDE 600; 310; 30; 20 MG/100ML; MG/100ML; MG/100ML; MG/100ML
500 INJECTION, SOLUTION INTRAVENOUS ONCE
Status: DISCONTINUED | OUTPATIENT
Start: 2023-02-15 | End: 2023-02-15

## 2023-02-15 RX ORDER — ONDANSETRON 4 MG/1
4 TABLET, FILM COATED ORAL EVERY 6 HOURS PRN
Qty: 20 TABLET | Refills: 0 | Status: SHIPPED | OUTPATIENT
Start: 2023-02-15

## 2023-02-15 RX ORDER — OXYCODONE HYDROCHLORIDE 5 MG/1
5 TABLET ORAL ONCE AS NEEDED
Status: COMPLETED | OUTPATIENT
Start: 2023-02-15 | End: 2023-02-15

## 2023-02-15 RX ORDER — ONDANSETRON 4 MG/1
8 TABLET, ORALLY DISINTEGRATING ORAL EVERY 8 HOURS PRN
Status: CANCELLED | OUTPATIENT
Start: 2023-02-15

## 2023-02-15 RX ORDER — BUPIVACAINE HYDROCHLORIDE 5 MG/ML
INJECTION, SOLUTION EPIDURAL; INTRACAUDAL
Status: COMPLETED | OUTPATIENT
Start: 2023-02-15 | End: 2023-02-15

## 2023-02-15 RX ORDER — FENTANYL CITRATE 50 UG/ML
INJECTION, SOLUTION INTRAMUSCULAR; INTRAVENOUS
Status: DISCONTINUED | OUTPATIENT
Start: 2023-02-15 | End: 2023-02-15

## 2023-02-15 RX ORDER — HYDROMORPHONE HYDROCHLORIDE 2 MG/ML
0.2 INJECTION, SOLUTION INTRAMUSCULAR; INTRAVENOUS; SUBCUTANEOUS EVERY 5 MIN PRN
Status: ACTIVE | OUTPATIENT
Start: 2023-02-15

## 2023-02-15 RX ORDER — IBUPROFEN 600 MG/1
600 TABLET ORAL 3 TIMES DAILY
Qty: 30 TABLET | Refills: 0 | Status: SHIPPED | OUTPATIENT
Start: 2023-02-15

## 2023-02-15 RX ORDER — OXYCODONE AND ACETAMINOPHEN 5; 325 MG/1; MG/1
1 TABLET ORAL EVERY 4 HOURS PRN
Qty: 28 TABLET | Refills: 0 | Status: SHIPPED | OUTPATIENT
Start: 2023-02-15 | End: 2023-03-06 | Stop reason: SDUPTHER

## 2023-02-15 RX ORDER — MEPERIDINE HYDROCHLORIDE 50 MG/ML
12.5 INJECTION INTRAMUSCULAR; INTRAVENOUS; SUBCUTANEOUS ONCE AS NEEDED
Status: ACTIVE | OUTPATIENT
Start: 2023-02-15 | End: 2023-02-16

## 2023-02-15 RX ORDER — SODIUM CHLORIDE 0.9 % (FLUSH) 0.9 %
3 SYRINGE (ML) INJECTION EVERY 8 HOURS
Status: DISCONTINUED | OUTPATIENT
Start: 2023-02-15 | End: 2023-02-15

## 2023-02-15 RX ORDER — SODIUM CHLORIDE, SODIUM LACTATE, POTASSIUM CHLORIDE, CALCIUM CHLORIDE 600; 310; 30; 20 MG/100ML; MG/100ML; MG/100ML; MG/100ML
10 INJECTION, SOLUTION INTRAVENOUS CONTINUOUS
Status: DISCONTINUED | OUTPATIENT
Start: 2023-02-15 | End: 2023-02-15

## 2023-02-15 RX ORDER — KETOROLAC TROMETHAMINE 30 MG/ML
INJECTION, SOLUTION INTRAMUSCULAR; INTRAVENOUS
Status: DISCONTINUED | OUTPATIENT
Start: 2023-02-15 | End: 2023-02-15

## 2023-02-15 RX ORDER — PROPOFOL 10 MG/ML
VIAL (ML) INTRAVENOUS
Status: DISCONTINUED | OUTPATIENT
Start: 2023-02-15 | End: 2023-02-15

## 2023-02-15 RX ORDER — ONDANSETRON 2 MG/ML
4 INJECTION INTRAMUSCULAR; INTRAVENOUS ONCE AS NEEDED
Status: ACTIVE | OUTPATIENT
Start: 2023-02-15 | End: 2034-07-14

## 2023-02-15 RX ADMIN — PROPOFOL 150 MG: 10 INJECTION, EMULSION INTRAVENOUS at 11:02

## 2023-02-15 RX ADMIN — LIDOCAINE HYDROCHLORIDE 100 MG: 20 INJECTION INTRAVENOUS at 11:02

## 2023-02-15 RX ADMIN — KETOROLAC TROMETHAMINE 30 MG: 30 INJECTION, SOLUTION INTRAMUSCULAR; INTRAVENOUS at 12:02

## 2023-02-15 RX ADMIN — FENTANYL CITRATE 25 MCG: 50 INJECTION INTRAMUSCULAR; INTRAVENOUS at 12:02

## 2023-02-15 RX ADMIN — MIDAZOLAM HYDROCHLORIDE 2 MG: 1 INJECTION, SOLUTION INTRAMUSCULAR; INTRAVENOUS at 10:02

## 2023-02-15 RX ADMIN — CEFAZOLIN SODIUM 2 G: 2 SOLUTION INTRAVENOUS at 11:02

## 2023-02-15 RX ADMIN — MUPIROCIN: 20 OINTMENT TOPICAL at 09:02

## 2023-02-15 RX ADMIN — OXYCODONE 5 MG: 5 TABLET ORAL at 12:02

## 2023-02-15 RX ADMIN — LIDOCAINE HYDROCHLORIDE 10 MG: 10 INJECTION, SOLUTION EPIDURAL; INFILTRATION; INTRACAUDAL; PERINEURAL at 09:02

## 2023-02-15 RX ADMIN — GLYCOPYRROLATE 0.2 MG: 0.2 INJECTION, SOLUTION INTRAMUSCULAR; INTRAVITREAL at 11:02

## 2023-02-15 RX ADMIN — PROCHLORPERAZINE EDISYLATE 5 MG: 5 INJECTION INTRAMUSCULAR; INTRAVENOUS at 12:02

## 2023-02-15 RX ADMIN — DEXAMETHASONE SODIUM PHOSPHATE 4 MG: 4 INJECTION, SOLUTION INTRA-ARTICULAR; INTRALESIONAL; INTRAMUSCULAR; INTRAVENOUS; SOFT TISSUE at 11:02

## 2023-02-15 RX ADMIN — SODIUM CHLORIDE, SODIUM GLUCONATE, SODIUM ACETATE, POTASSIUM CHLORIDE AND MAGNESIUM CHLORIDE: 526; 502; 368; 37; 30 INJECTION, SOLUTION INTRAVENOUS at 09:02

## 2023-02-15 RX ADMIN — FENTANYL CITRATE 100 MCG: 50 INJECTION, SOLUTION INTRAMUSCULAR; INTRAVENOUS at 10:02

## 2023-02-15 RX ADMIN — ONDANSETRON 4 MG: 2 INJECTION INTRAMUSCULAR; INTRAVENOUS at 11:02

## 2023-02-15 RX ADMIN — BUPIVACAINE 20 ML: 13.3 INJECTION, SUSPENSION, LIPOSOMAL INFILTRATION at 10:02

## 2023-02-15 RX ADMIN — FENTANYL CITRATE 50 MCG: 50 INJECTION, SOLUTION INTRAMUSCULAR; INTRAVENOUS at 12:02

## 2023-02-15 RX ADMIN — BUPIVACAINE HYDROCHLORIDE 10 ML: 5 INJECTION, SOLUTION EPIDURAL; INTRACAUDAL; PERINEURAL at 10:02

## 2023-02-15 RX ADMIN — FENTANYL CITRATE 50 MCG: 50 INJECTION, SOLUTION INTRAMUSCULAR; INTRAVENOUS at 11:02

## 2023-02-15 RX ADMIN — SODIUM CHLORIDE, SODIUM GLUCONATE, SODIUM ACETATE, POTASSIUM CHLORIDE AND MAGNESIUM CHLORIDE: 526; 502; 368; 37; 30 INJECTION, SOLUTION INTRAVENOUS at 12:02

## 2023-02-15 NOTE — ANESTHESIA PROCEDURE NOTES
Peripheral Block    Patient location during procedure: pre-op   Block not for primary anesthetic.  Reason for block: at surgeon's request and post-op pain management   Post-op Pain Location: left knee   Start time: 2/15/2023 10:15 AM  Timeout: 2/15/2023 10:15 AM   End time: 2/15/2023 10:20 AM    Staffing  Authorizing Provider: Jairo Rivers MD  Performing Provider: Jairo Rivers MD    Preanesthetic Checklist  Completed: patient identified, IV checked, site marked, risks and benefits discussed, surgical consent, monitors and equipment checked, pre-op evaluation and timeout performed  Peripheral Block  Patient position: supine  Prep: ChloraPrep  Patient monitoring: heart rate, cardiac monitor, continuous pulse ox, continuous capnometry and frequent blood pressure checks  Block type: adductor canal  Laterality: left  Injection technique: single shot  Needle  Needle type: Stimuplex   Needle gauge: 21 G  Needle length: 4 in  Needle localization: anatomical landmarks and ultrasound guidance   -ultrasound image captured on disc.  Assessment  Injection assessment: negative aspiration, negative parasthesia and local visualized surrounding nerve  Paresthesia pain: none  Heart rate change: no  Slow fractionated injection: yes  Pain Tolerance: comfortable throughout block and no complaints  Medications:    Medications: bupivacaine (pf) (MARCAINE) injection 0.5% - Perineural   10 mL - 2/15/2023 10:20:00 AM    Additional Notes  VSS.  [facility] RN monitoring vitals throughout procedure.  Patient tolerated procedure well.

## 2023-02-15 NOTE — H&P
Patient ID: Serene Davis is a 52 y.o. female     Chief Complaint:       Chief Complaint   Patient presents with    Left Knee - Post-op Evaluation         History of Present Illness:     Serene Davis is a pleasant 52 y.o. female who presents for evaluation of left knee pain. She  reports pain for the past 2 weeks. She does endorse a history of trauma.  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  Bending and straightening makes it worse and rest makes it better. The pain is mostly in front of the knee and occasionally lateral. She does endorse nighttime pain.   She is independent with all activities of daily living.      In the past she has tried the following treatments:    NSAIDS      She currently does work.   Occupation:  Works at a assisted living facility     Instability: No  Mechanical Symptoms: Yes     Diabetic:  No  Smoker:  No        ____________________________________________________________________     Interval history 01/13/2023 : Patient returns today for re-evaluation of her left knee.  She is being treated non operatively for a left knee lateral meniscus tear and LCL strain.  She reports she is doing significantly better however still complaining of difficulty straightening her knee fully as well as some instability.        PAST MEDICAL HISTORY:        Past Medical History:   Diagnosis Date    Graves disease        PAST SURGICAL HISTORY: History reviewed. No pertinent surgical history.  FAMILY HISTORY: History reviewed. No pertinent family history.  SOCIAL HISTORY:   Social History           Occupational History    Not on file   Tobacco Use    Smoking status: Not on file    Smokeless tobacco: Not on file   Substance and Sexual Activity    Alcohol use: Not on file    Drug use: Not on file    Sexual activity: Not on file         MEDICATIONS:   Current Outpatient Medications:     etodolac (LODINE) 300  MG Cap, Take 1 capsule (300 mg total) by mouth 2 (two) times daily., Disp: 30 capsule, Rfl: 0    gabapentin (NEURONTIN) 800 MG tablet, Take 800 mg by mouth once daily., Disp: , Rfl:     levothyroxine (SYNTHROID) 100 MCG tablet, Take 100 mcg by mouth before breakfast., Disp: , Rfl:     tirzepatide (MOUNJARO) 2.5 mg/0.5 mL PnIj, Inject 2.5 mg into the skin., Disp: , Rfl:   ALLERGIES:        Review of patient's allergies indicates:   Allergen Reactions    Adhesive              Physical Exam      There were no vitals filed for this visit.        Alert and oriented to person, place and time. No acute distress. Well-groomed, not ill appearing. Pupils round and reactive, normal respiratory effort, no audible wheezing.      GENERAL:  A well-developed, well-nourished 52 y.o. female who is alert and       oriented in no acute distress.       Gait: She  walks with a antalgic gait.                   EXTREMITIES:  Examination of lower extremities reveals there is no visible mass or deformity.     Left knee:        ROM 0-115.  Pain with terminal extension                          Ligamentously stable to varus/valgus stress.                          Anterior and posterior drawers negative.                          No pain over pes bursa.                          No warmth                          No erythema                           Effusion Yes, but improved                          lateral joint line tenderness                          Positive Patellofemoral grind/crepitus            The skin over both lower extremities is normal and unremarkable.  She has a  painless range of motion of the hips and ankles bilaterally.   Sensation is intact in both lower extremities.    There are no motor deficits in the lower extremities bilaterally.   Pedal pulses are palpable distally bilaterally.    She has no calf tenderness to palpation nor edema.           Imagin view  standing left knee X-rays ordered/reviewed by me showing no  evidence of fracture or dislocation. There is no obvious malalignment. No evidence of masses, lesions or foreign bodies.  Mild to moderate degenerative changes, worse on the right knee.  Bone spur on the superior pole patella.     MRI left knee without contrast:  Interstitial tearing of the LCL.  Signal change in the lateral meniscus with likely body tear.  There is mild to moderate tricompartmental arthritic changes.  Patellofemoral arthritis.     Assessment & Plan     Acute lateral meniscus tear of left knee, initial encounter     Arthritis of left knee           Treatment options discussed with her.  She has persistent left knee pain and instability and difficulty with full extension.  This is all secondary to her traumatic injury about 2-3 months ago.  Prior to this she had no knee pain.  My recommendation at this time included continued nonoperative treatment as well as surgery.  She would like to proceed with surgery due to persistent difficulty with extension as well as instability.  Likely secondary to lateral meniscus tear and loose cartilage.     _________________________________________________________________        Diagnosis and findings were discussed with her in lay terms. I discussed the findings and treatment options with them at length. Questions were actively sought and all questions were answered to their apparent satisfaction. We discussed the risks and benefits of surgery. We reviewed the operative indications surgical technique and potential rehabilitation involved. Risks including, but not limited to pain, bleeding, infection, damage surrounding neurovascular structures, and other soft tissues, decreased range of motion, instability, poor cosmetic result, scarring/painful scars, poor functional result, reflex sympathetic dystrophy. We discussed as well the risk of anesthesia, DVT/PE and possible need for additional surgical intervention in lay terms. Despite the risks as explained to them,  they wished to proceed with surgery. She expressed understanding and agreement with the treatment plan and understand that no guarantee of outcome is implied or expressed given.         Serene Davis will be scheduled for the following procedure(s):      Left knee arthroscopy with lateral meniscus debridement  Chondroplasty left knee        Post-Op Medications to be prescribed:   Percocet 5/325mg Take 1-2 tablets every 4-6 hours PRN pain #28  Zofran 4mg oral disintegrating tablets every 8 hours PRN nausea/vomiting   ASA 81mg daily x 2 weeks  Motrin 600 mg TID PRN      DME/Bracing:  None  Post-op Physical Therapy to begin: immediately post-op     Medical Clearance: Yes, history of thyroid disease  Hx of DVT,PE, anesthetic complications: No     Additional notes/concerns:  None     Opioid Disclosure  Today we discussed the reasons why the prescription is necessary as well as: the risks of addiction and overdose associated with opioid drugs and the dangers of taking opioid drugs with alcohol, benzodiazepines and other central nervous system depressants; alternative treatments that may be available; the risks associated with the use of the drugs being prescribed, specifically that opioids are highly addictive, even when taken as prescribed, that there is a risk of developing a physical or psychological dependence on the controlled dangerous substance, and that the risks of taking more opioids than prescribed or mixing sedatives, benzodiazepines or alcohol with opioids can result in fatal respiratory depression.

## 2023-02-15 NOTE — ANESTHESIA PREPROCEDURE EVALUATION
02/15/2023  Serene Davis is a 52 y.o., female.      Pre-op Assessment    I have reviewed the Patient Summary Reports.     I have reviewed the Nursing Notes. I have reviewed the NPO Status.   I have reviewed the Medications.     Review of Systems  Anesthesia Hx:  No problems with previous Anesthesia    Social:  Smoker    Hematology/Oncology:  Hematology Normal   Oncology Normal     EENT/Dental:EENT/Dental Normal   Cardiovascular:  Cardiovascular Normal     Pulmonary:  Pulmonary Normal    Renal/:  Renal/ Normal     Hepatic/GI:  Hepatic/GI Normal    Musculoskeletal:   Acute lateral meniscus tear of left knee    Neurological:  Neurology Normal    Endocrine:  Obesity / BMI > 30  Dermatological:  Skin Normal    Psych:  Psychiatric Normal           Physical Exam  General: Cooperative, Alert and Oriented    Airway:  Mallampati: II   Mouth Opening: Normal  TM Distance: Normal  Neck ROM: Normal ROM    Dental:  Intact        Anesthesia Plan  Type of Anesthesia, risks & benefits discussed:    Anesthesia Type: Gen Supraglottic Airway  Intra-op Monitoring Plan: Standard ASA Monitors  Post Op Pain Control Plan: multimodal analgesia, IV/PO Opioids PRN and peripheral nerve block  Induction:  IV  Airway Plan: Direct  Informed Consent: Informed consent signed with the Patient and all parties understand the risks and agree with anesthesia plan.  All questions answered.   ASA Score: 2  Day of Surgery Review of History & Physical: H&P Update referred to the surgeon/provider.    Ready For Surgery From Anesthesia Perspective.     .

## 2023-02-15 NOTE — DISCHARGE INSTRUCTIONS
"Discharge Instructions: After Your Surgery/Procedure  Youve just had surgery. During surgery you were given medicine called anesthesia to keep you relaxed and free of pain. After surgery you may have some pain or nausea. This is common. Here are some tips for feeling better and getting well after surgery.     Stay on schedule with your medication.   Going home  Your doctor or nurse will show you how to take care of yourself when you go home. He or she will also answer your questions. Have an adult family member or friend drive you home.      For your safety we recommend these precaution for the first 24 hours after your procedure:  Do not drive or use heavy equipment.  Do not make important decisions or sign legal papers.  Do not drink alcohol.  Have someone stay with you, if needed. He or she can watch for problems and help keep you safe.  Your concentration, balance, coordination, and judgement may be impaired for many hours after anesthesia.  Use caution when ambulating or standing up.     You may feel weak and "washed out" after anesthesia and surgery.      Subtle residual effects of general anesthesia or sedation with regional / local anesthesia can last more than 24 hours.  Rest for the remainder of the day or longer if your Doctor/Surgeon has advised you to do so.  Although you may feel normal within the first 24 hours, your reflexes and mental ability may be impaired without you realizing it.  You may feel dizzy, lightheaded or sleepy for 24 hours or longer.      Be sure to go to all follow-up visits with your doctor. And rest after your surgery for as long as your doctor tells you to.  Coping with pain  If you have pain after surgery, pain medicine will help you feel better. Take it as told, before pain becomes severe. Also, ask your doctor or pharmacist about other ways to control pain. This might be with heat, ice, or relaxation. And follow any other instructions your surgeon or nurse gives you.  Tips " for taking pain medicine  To get the best relief possible, remember these points:  Pain medicines can upset your stomach. Taking them with a little food may help.  Most pain relievers taken by mouth need at least 20 to 30 minutes to start to work.  Taking medicine on a schedule can help you remember to take it. Try to time your medicine so that you can take it before starting an activity. This might be before you get dressed, go for a walk, or sit down for dinner.  Constipation is a common side effect of pain medicines. Call your doctor before taking any medicines such as laxatives or stool softeners to help ease constipation. Also ask if you should skip any foods. Drinking lots of fluids and eating foods such as fruits and vegetables that are high in fiber can also help. Remember, do not take laxatives unless your surgeon has prescribed them.  Drinking alcohol and taking pain medicine can cause dizziness and slow your breathing. It can even be deadly. Do not drink alcohol while taking pain medicine.  Pain medicine can make you react more slowly to things. Do not drive or run machinery while taking pain medicine.  Your health care provider may tell you to take acetaminophen to help ease your pain. Ask him or her how much you are supposed to take each day. Acetaminophen or other pain relievers may interact with your prescription medicines or other over-the-counter (OTC) drugs. Some prescription medicines have acetaminophen and other ingredients. Using both prescription and OTC acetaminophen for pain can cause you to overdose. Read the labels on your OTC medicines with care. This will help you to clearly know the list of ingredients, how much to take, and any warnings. It may also help you not take too much acetaminophen. If you have questions or do not understand the information, ask your pharmacist or health care provider to explain it to you before you take the OTC medicine.  Managing nausea  Some people have an  upset stomach after surgery. This is often because of anesthesia, pain, or pain medicine, or the stress of surgery. These tips will help you handle nausea and eat healthy foods as you get better. If you were on a special food plan before surgery, ask your doctor if you should follow it while you get better. These tips may help:  Do not push yourself to eat. Your body will tell you when to eat and how much.  Start off with clear liquids and soup. They are easier to digest.  Next try semi-solid foods, such as mashed potatoes, applesauce, and gelatin, as you feel ready.  Slowly move to solid foods. Dont eat fatty, rich, or spicy foods at first.  Do not force yourself to have 3 large meals a day. Instead eat smaller amounts more often.  Take pain medicines with a small amount of solid food, such as crackers or toast, to avoid nausea.     Call your surgeon if  You still have pain an hour after taking medicine. The medicine may not be strong enough.  You feel too sleepy, dizzy, or groggy. The medicine may be too strong.  You have side effects like nausea, vomiting, or skin changes, such as rash, itching, or hives.       If you have obstructive sleep apnea  You were given anesthesia medicine during surgery to keep you comfortable and free of pain. After surgery, you may have more apnea spells because of this medicine and other medicines you were given. The spells may last longer than usual.   At home:  Keep using the continuous positive airway pressure (CPAP) device when you sleep. Unless your health care provider tells you not to, use it when you sleep, day or night. CPAP is a common device used to treat obstructive sleep apnea.  Talk with your provider before taking any pain medicine, muscle relaxants, or sedatives. Your provider will tell you about the possible dangers of taking these medicines.  © 9972-3274 [employer]. [address] [place] [address], [place] [address]. All rights reserved. This information is  not intended as a substitute for professional medical care. Always follow your healthcare professional's instructions.   Post op instructions for prevention of DVT  What is deep vein thrombosis?  Deep vein thrombosis (DVT) is the medical term for blood clots in the deep veins of the leg.  These blood clots can be dangerous.  A DVT can block a blood vessel and keep blood from getting where it needs to go.  Another problem is that the clot can travel to other parts of the body such as the lungs.  A clot that travels to the lungs is called a pulmonary embolus (PE) and can cause serious problems with breathing which can lead to death.  Am I at risk for DVT/PE?  If you are not very active, you are at risk of DVT.  Anyone confined to bed, sitting for long periods of time, recovering from surgery, etc. increases the risk of DVT.  Other risk factors are cancer diagnosis, certain medications, estrogen replacement in any form,older age, obesity, pregnancy, smoking, history of clotting disorders, and dehydration.  How will I know if I have a DVT?  Swelling in the lower leg  Pain  Warmth, redness, hardness or bulging of the vein  If you have any of these symptoms, call your doctors office right away.  Some people will not have any symptoms until the clot moves to the lungs.  What are the symptoms of a PE?  Panting, shortness of breath, or trouble breathing  Sharp, knife-like chest pain when you breathe  Coughing or coughing up blood  Rapid heartbeat  If you have any of these symptoms or get worse quickly, call 911 for emergency treatment.  How can I prevent a DVT?  Avoid long periods of inactivity and dont cross your legs--get up and walk around every hour or so.  Stay active--walking after surgery is highly encouraged.  This means you should get out of the house and walk in the neighborhood.  Going up and down stairs will not impair healing (unless advised against such activity by your doctor).    Drink plenty of  noncaffeinated, nonalcoholic fluids each day to prevent dehydration.  Wear special support stockings, if they have been advised by your doctor.  If you travel, stop at least once an hour and walk around.  Avoid smoking (assistance with stopping is available through your healthcare provider)  Always notify your doctor if you are not able to follow the post operative instructions that are given to you at the time of discharge.  It may be necessary to prescribe one of the medications available to prevent DVT.     Using an Incentive Spirometer  An incentive spirometer is a device that helps you do deep breathing exercises. These exercises expand your lungs, aid in circulation, and help prevent pneumonia. Deep breathing exercises also help you breathe better and improve the function of your lungs by:  Keeping your lungs clear  Strengthening your breathing muscles  Helping prevent respiratory complications or problems  The incentive spirometer gives you a way to take an active part in recover. A nurse or therapist will teach you breathing exercises. To do these exercises, you will breathe in through your mouth and not your nose. The incentive spirometer only works correctly if you breathe in through your mouth.  Steps to clear lungs  Step 1. Exhale normally. Then, inhale normally.  Relax and breathe out.  Step 2. Place your lips tightly around the mouthpiece.  Make sure the device is upright and not tilted.  Step 3. Inhale as much air as you can through the mouthpiece (don't breath through your nose).  Inhale slowly and deeply.  Hold your breath long enough to keep the balls or disk raised for at least 3 to 5 seconds, or as instructed by your healthcare provider.  Some spirometers have an indicator to let you know that you are breathing in too fast. If the indicator goes off, breathe in more slowly.  Step 4. Repeat the exercise regularly.  Do this exercise every hour while you're awake, or as instructed by your healthcare  provider.  If you were taught deep breathing and coughing exercises, do them regularly as instructed by your healthcare provider.

## 2023-02-15 NOTE — PLAN OF CARE
Report to Mónica. Patient denies pain, no nausea, dressing to right knee dry intact no drainage, ice to knee,ice pack for home use.Vs stable no distress noted or voiced, son in attendance

## 2023-02-15 NOTE — PLAN OF CARE
Meets criteria for discharge. Discharged to home with son. Denies nausea. Tolerating fluids. Denies pain. States she has crutches. Discharge instructions reviewed and printed handout given. Verbalized understanding.

## 2023-02-15 NOTE — ANESTHESIA POSTPROCEDURE EVALUATION
Anesthesia Post Evaluation    Patient: Serene Davis    Procedure(s) Performed: Procedure(s) (LRB):  ARTHROSCOPY, KNEE, WITH MENISCECTOMY (Left)    Final Anesthesia Type: general      Patient location during evaluation: PACU  Patient participation: Yes- Able to Participate  Level of consciousness: awake and alert  Post-procedure vital signs: reviewed and stable  Pain management: adequate  Airway patency: patent    PONV status at discharge: No PONV  Anesthetic complications: no      Cardiovascular status: hemodynamically stable  Respiratory status: unassisted and room air  Hydration status: euvolemic  Follow-up not needed.          Vitals Value Taken Time   /65 02/15/23 1420   Temp 36.7 °C (98 °F) 02/15/23 1420   Pulse 53 02/15/23 1420   Resp 18 02/15/23 1420   SpO2 100 % 02/15/23 1420         Event Time   Out of Recovery 13:31:00         Pain/Patty Score: Pain Rating Prior to Med Admin: 3 (2/15/2023  1:25 PM)  Pain Rating Post Med Admin: 4 (2/15/2023  1:15 PM)  Patty Score: 10 (2/15/2023  1:25 PM)  Modified Patty Score: 20 (2/15/2023  2:20 PM)

## 2023-02-15 NOTE — OP NOTE
02/15/2023    PREOPERATIVE DIAGNOSIS:      Left knee chondromalacia, lateral femoral condyle  Left knee lateral meniscus tear  Left knee patellofemoral arthrosis    POSTOPERATIVE DIAGNOSIS: Same    PROCEDURE:      Left knee arthroscopic chondroplasty lateral femoral condyle with microfracture  Left knee arthroscopic lateral meniscus debridement  Left knee arthroscopic lateral release      SURGEON: Bob Martines MD    ASSISTANT: Loni Valera _ 1st assist     She was present and scrubbed for the entirety of the procedure. They were required for patient positioning, retraction, insertion of implants and closure. Without her I would have been unable to safely perform the case due to only one scrub tech available.     ANESTHESIA:  General with block     ESTIMATED BLOOD LOSS:  10 mL    INDICATIONS:  Serene Davis is a 52 y.o. year-old female who presented to my clinic with a chief complaint of left knee pain. Imaging revealed a lateral meniscus tear and chondromalacia.  She underwent physical therapy and nonoperative treatment with injections but continued to have pain and instability.  We discussed operative treatment including diagnostic arthroscopy with indicated procedures.      We discussed the risks and benefits of the surgery in detail including stiffness, CRPS, damage to surrounding neurovascular structures, failure of fixation as well as need for revision surgery. Despite these risks they elected to proceed.       COMPONENTS USED:      * No implants in log *      DESCRIPTION OF PROCEDURE:  The patient was seen in the pre-operative area where consents were verified and the surgical site marked. They did receive a preoperative block for intra-operative and postoperative analgesia. The patient was taken to the Operating Room where anesthesia was administered by the Anesthesia Department. She was then placed in the supine  position and all superficial neurovascular structures were well padded.  The left  knee  was then sterilely prepped and draped in the normal fashion.  Preoperative antibiotics were administered.  A time-out was performed verifying the procedure and laterality, all agreed and elected to proceed as planned.    We began by making a standard lateral incision after Esmarch tourniquet was used to exsanguinate the limb.  Camera was inserted into the suprapatellar pouch a diagnostic arthroscopy was performed.  There was no evidence of loose body.  There was some mild synovitis of the suprapatellar pouch.  The patella was tracking laterally.  There was an area of grade 2 changes on the central facet of the patella.  The medial compartment was evaluated and using direct visualization with a spinal needle a medial portal was created.  There was some mild synovitis at the posterior root attachment but this was intact.  I do not appreciate any significant tearing.  There was some grade 1 chondromalacia of the medial femoral condyle which I debrided gently with a arthroscopic shaver.  Within the notch the ACL was intact but again there was significant notch synovitis.  I did debride this using a arthroscopic shaver.  I did remove some of the inferior fat pad for visualization.  The knee was placed in a figure 4 position in the lateral compartment was evaluated.  There was a 3 x 1 cm longitudinal area on the lateral femoral condyle which was on the weight-bearing surface.  This was full-thickness.  I did elect to perform a microfracture as this was an isolated lesion.  I used a 45 degree all and placed multiple penetrating holes within the area until I got to a bleeding bed.  I used the arthroscopic shaver to debride any loose pieces of cartilage down to a stable base.  I then evaluated the lateral meniscus.  There was a undersurface tear of the white-white zone of the body of the meniscus which I debrided with an arthroscopic shaver down to a stable base.  I then turned my attention to the suprapatellar pouch  we also debrided the undersurface of patella down to a stable base in the central facet.  Again there was lateral tilt and translation.  I did do a gentle lateral release using an ablator to help translate the patella slightly more medial.  At this point I irrigated and closed with 3-0 nylon.  She was placed in a sterile dressing.    Disposition:      She will be partial weight-bearing for 2 weeks and then will advance after that point if doing well.  We will send a referral for physical therapy to work on range of motion and strengthening.  I will see her back in 2 weeks for range-of-motion check.    Bob Martines MD

## 2023-02-15 NOTE — TRANSFER OF CARE
"Anesthesia Transfer of Care Note    Patient: Serene Davis    Procedure(s) Performed: Procedure(s) (LRB):  ARTHROSCOPY, KNEE, WITH MENISCECTOMY (Left)    Patient location: PACU    Anesthesia Type: general    Transport from OR: Transported from OR on 2-3 L/min O2 by NC with adequate spontaneous ventilation    Post pain: adequate analgesia    Post assessment: no apparent anesthetic complications and tolerated procedure well    Post vital signs: stable    Level of consciousness: awake, alert and oriented    Nausea/Vomiting: no nausea/vomiting    Complications: none    Transfer of care protocol was followed      Last vitals:   Visit Vitals  /77   Pulse 70   Temp 36.8 °C (98.3 °F) (Temporal)   Resp 18   Ht 5' 4" (1.626 m)   Wt 102.5 kg (226 lb)   SpO2 100%   Breastfeeding No   BMI 38.79 kg/m²     "

## 2023-02-15 NOTE — PATIENT INSTRUCTIONS
Knee Arthroscopy Post-op Instructions    Wound Care    Maintain your operative dressing, loosen bandage if swelling of the foot or ankle occurs.  It is normal for the knee to bleed and swell following surgery - if blood soaks the bandage, do not become alarmed - reinforce with additional dressing.    Remove surgical dressing on the second postoperative day and apply waterproof Band-Aids over incisions and change daily.     Physical therapy may change your dressing prior to the second postoperative day.     You may shower after removing the first dressing by placing waterproof Band-Aids over incision areas.     Do NOT immerse the operative leg until 14 days after surgery     Icing    Icing is very important for the first 5-7 days after surgery.   Use an ice machine continuously or ice packs every 2 hours for 20 minutes daily until your first postoperative visit.   Do not place the ice bag or cooling device directly on the skin. Care must be taken to avoid frostbite to the skin    Activity  You can bear weight on your leg 25%. Begin with partial weight bearing for the first 2 weeks following surgery and then progress as tolerated.     Range of motion, ankle pumps, quad set, heel slides and straight leg raises are encouraged for the first 7 days after surgery.    Full range of motion of the knee is allowed.      Elevate your surgical knee, preferably, above the heart.     While exercises are important, dont overdo it. Common sense is the rule. Increased swelling and/or pain is usually an indication youre overdoing it.    Start physical therapy within 1 week.     Medications    Do not drive a car or operate heavy machinery while taking narcotics.     You have been prescribed a narcotic (either Norco or Percocet) for pain control. This is to be used for a short time period.     Take 1-2 tablets every 4-6 hours as needed  Max of 12 pills per day  Plan on using for 2-5 days, depending on the level of pain.    Do not take  additional Tylenol (Acetaminophen) while taking Percocet.    Common side effects include nausea, drowsiness and constipation. Take medication with food to decrease side effects.     Ibuprofen (600-800mg) may be taken in between the narcotic medication.     You should take an aspirin (81mg) twice daily for 2 weeks. This may lower the risk of a blood clot developing after surgery. Should severe calf pain occur or significant swelling of the calf or ankle, please contact us.      You should resume your normal medications for other conditions the day after surgery. You may not drive or operate heavy equipment while on narcotics. It is important not to drink while taking narcotic medication.     Diet    Resume normal diet as tolerated this evening. We have no specific diet restrictions after surgery, but extensive use of narcotics can lead to constipation. High fiber diets, lots of fluids and muscle activity can prevent this occurrence.    The anesthetic drugs used during surgery may cause nausea for the first 24 hours. If nausea is encountered, drink only clear liquids. The only solids should be dry crackers or toast. If the nausea and vomiting become severe or you show signs of being dehydrated (lack of urination), please call.      EMERGENCIES    Contact Dr. Martines or his nurse if any of the following are present:     Difficulty breathing  Painful swelling or numbness   Unrelenting pain  Fever (over 101° - it is normal to have a low grade fever for the first day or two following surgery) or chills   Redness around incisions   Color of lower extremity   Continuous drainage or bleeding from incision (a small amount of drainage is expected)   Excessive nausea/vomiting     **If you have an emergency after office hours or on weekends, call our office and you will be connected to our page service - they will contact Dr. Martines or one of his partners if he is unavailable.      **If you have an emergency that requires  immediate attention, proceed to the nearest emergency room or call 911

## 2023-02-15 NOTE — ANESTHESIA PROCEDURE NOTES
Intubation    Date/Time: 2/15/2023 11:08 AM  Performed by: Zack Bergman CRNA  Authorized by: Jairo Rivers MD     Intubation:     Induction:  Intravenous    Intubated:  Postinduction    Mask Ventilation:  Easy mask    Attempts:  1    Attempted By:  CRNA    Difficult Airway Encountered?: No      Complications:  None    Airway Device:  Supraglottic airway/LMA    Airway Device Size:  3.0    Style/Cuff Inflation:  Cuffed (inflated to minimal occlusive pressure)    Inflation Amount (mL):  15    Placement Verified By:  Capnometry    Complicating Factors:  None    Findings Post-Intubation:  BS equal bilateral and atraumatic/condition of teeth unchanged

## 2023-02-15 NOTE — BRIEF OP NOTE
"[facility]  Brief Operative Note    Surgery Date: 2/15/2023     Surgeon(s) and Role:     * Bob Martines MD - Primary    Assisting Surgeon: None    Pre-op Diagnosis:  Acute lateral meniscus tear of left knee, initial encounter [S83.282A]  Chondromalacia of left patella [M22.42]  Pre-op testing [Z01.818]    Post-op Diagnosis:  Post-Op Diagnosis Codes:     * Acute lateral meniscus tear of left knee, initial encounter [S83.282A]     * Chondromalacia of left patella [M22.42]     * Pre-op testing [Z01.818]    Procedure(s) (LRB):  ARTHROSCOPY, KNEE, WITH MENISCECTOMY (Left)    Anesthesia: General/Regional    Operative Findings: see op note     Estimated Blood Loss: 10cc          Specimens:   Specimen (24h ago, onward)      None              Discharge Note    OUTCOME: Patient tolerated treatment/procedure well without complication and is now ready for discharge.    DISPOSITION: Home or Self Care    FINAL DIAGNOSIS:  <principal problem not specified>    FOLLOWUP: In clinic    DISCHARGE INSTRUCTIONS:    Discharge Procedure Orders   CRUTCHES FOR HOME USE     Order Specific Question Answer Comments   Type: Axillary    Height: 5' 4" (1.626 m)    Weight: 102.5 kg (226 lb)    Length of need (1-99 months): 99      Diet general     Keep surgical extremity elevated     Ice to affected area     No driving, operating heavy equipment or signing legal documents while taking pain medication     Remove dressing in 48 hours   Order Comments: See patient discharge instructions     Call MD for:  temperature >100.4     Call MD for:  persistent nausea and vomiting     Call MD for:  severe uncontrolled pain     Call MD for:  difficulty breathing, headache or visual disturbances     Call MD for:  redness, tenderness, or signs of infection (pain, swelling, redness, odor or green/yellow discharge around incision site)     Call MD for:  hives     Call MD for:  persistent dizziness or light-headedness     Call MD for:  " extreme fatigue     Shower on day dressing removed (No bath)   Order Comments: See patient discharge instructions     Weight bearing restrictions (specify)   Order Comments: Partial weight bearing 25% for 2 weeks with crutches

## 2023-02-16 VITALS
BODY MASS INDEX: 38.58 KG/M2 | SYSTOLIC BLOOD PRESSURE: 128 MMHG | HEART RATE: 53 BPM | DIASTOLIC BLOOD PRESSURE: 65 MMHG | RESPIRATION RATE: 18 BRPM | OXYGEN SATURATION: 100 % | TEMPERATURE: 98 F | WEIGHT: 226 LBS | HEIGHT: 64 IN

## 2023-02-16 NOTE — PROGRESS NOTES
Very pleased with care given.  All staff and MD were excellent - a wonderful experience!! States she understands all  discharge instructions.

## 2023-03-03 ENCOUNTER — OFFICE VISIT (OUTPATIENT)
Dept: ORTHOPEDICS | Facility: CLINIC | Age: 53
End: 2023-03-03
Payer: MEDICAID

## 2023-03-03 ENCOUNTER — TELEPHONE (OUTPATIENT)
Dept: ORTHOPEDICS | Facility: CLINIC | Age: 53
End: 2023-03-03

## 2023-03-03 VITALS — RESPIRATION RATE: 18 BRPM | HEIGHT: 64 IN | WEIGHT: 226 LBS | BODY MASS INDEX: 38.58 KG/M2

## 2023-03-03 DIAGNOSIS — S83.282A ACUTE LATERAL MENISCUS TEAR OF LEFT KNEE, INITIAL ENCOUNTER: Primary | ICD-10-CM

## 2023-03-03 DIAGNOSIS — M22.42 CHONDROMALACIA OF LEFT PATELLA: ICD-10-CM

## 2023-03-03 PROCEDURE — 3008F PR BODY MASS INDEX (BMI) DOCUMENTED: ICD-10-PCS | Mod: CPTII,,, | Performed by: ORTHOPAEDIC SURGERY

## 2023-03-03 PROCEDURE — 99024 PR POST-OP FOLLOW-UP VISIT: ICD-10-PCS | Mod: ,,, | Performed by: ORTHOPAEDIC SURGERY

## 2023-03-03 PROCEDURE — 99024 POSTOP FOLLOW-UP VISIT: CPT | Mod: ,,, | Performed by: ORTHOPAEDIC SURGERY

## 2023-03-03 PROCEDURE — 3008F BODY MASS INDEX DOCD: CPT | Mod: CPTII,,, | Performed by: ORTHOPAEDIC SURGERY

## 2023-03-03 PROCEDURE — 99999 PR PBB SHADOW E&M-EST. PATIENT-LVL II: CPT | Mod: PBBFAC,,, | Performed by: ORTHOPAEDIC SURGERY

## 2023-03-03 PROCEDURE — 99999 PR PBB SHADOW E&M-EST. PATIENT-LVL II: ICD-10-PCS | Mod: PBBFAC,,, | Performed by: ORTHOPAEDIC SURGERY

## 2023-03-03 PROCEDURE — 99212 OFFICE O/P EST SF 10 MIN: CPT | Mod: PBBFAC,PN | Performed by: ORTHOPAEDIC SURGERY

## 2023-03-03 NOTE — PROGRESS NOTES
Post-op Note    HPI    Serene Davis is here 2 weeks s/p the following procedure:     Left knee arthroscopic chondroplasty lateral femoral condyle with microfracture  Left knee arthroscopic lateral meniscus debridement  Left knee arthroscopic lateral release    Overall doing well. Pain controlled on current regimen. She is currently enrolled in Physical Therapy  But has not yet started. Denies any chest pain or shortness of breathe. Denies any drainage from the incision. Denies any fevers, chills or paresthesias.  Pain is tolerable   unless she bends past 90°.  Pain still mostly lateral.    DVT Prophylaxis:  aspirin      Physical Exam:     Patient is alert and oriented no acute distress.   Assistive Device:   None     Left knee Incision(s) are well healed.  There is no evidence of dehiscence.  There is no induration erythema or signs of infection.  Appropriate soft tissue swelling.   Moderate joint effusion.Compartments are soft and compressible.  Warm well-perfused extremity.  Lateral joint line tenderness.  Range of motion 5-90        Assessment    Serene Davis is 2 weeks Post-op     Plan:    Overall doing as expected.  We discussed expectations of surgery and postoperative course.     Pain: Continued postoperative pain regimen --  Tylenol and ibuprofen as  needed  DVT prophylaxis: aspirin complete  PT/OT: Continue/Initiate physical therapy (weight bearing status:  weight-bearing as tolerated),      Follow-up: 4 weeks   X-rays next visit:  left knee standing

## 2023-03-03 NOTE — TELEPHONE ENCOUNTER
----- Message from Casimiro Ramos sent at 3/3/2023 12:48 PM CST -----  Regarding: where did you sent her pain pills, call pt ASAP   Contact: pt   where did you sent her pain pills, call pt ASAP

## 2023-03-06 ENCOUNTER — TELEPHONE (OUTPATIENT)
Dept: ORTHOPEDICS | Facility: CLINIC | Age: 53
End: 2023-03-06
Payer: MEDICAID

## 2023-03-06 RX ORDER — OXYCODONE AND ACETAMINOPHEN 5; 325 MG/1; MG/1
1 TABLET ORAL EVERY 4 HOURS PRN
Qty: 28 TABLET | Refills: 0 | Status: SHIPPED | OUTPATIENT
Start: 2023-03-06

## 2023-03-06 NOTE — TELEPHONE ENCOUNTER
Sent to Dr. Martines for review.    ----- Message from Casimiro Ramos sent at 3/6/2023  4:25 PM CST -----  Regarding: needs her pain medication, call pt ASAP   Contact: pt   needs her pain medication, 2nd call, call pt ASAP

## 2023-03-10 ENCOUNTER — CLINICAL SUPPORT (OUTPATIENT)
Dept: REHABILITATION | Facility: HOSPITAL | Age: 53
End: 2023-03-10
Attending: ORTHOPAEDIC SURGERY
Payer: COMMERCIAL

## 2023-03-10 DIAGNOSIS — M94.269 CHONDROMALACIA OF KNEE: Primary | ICD-10-CM

## 2023-03-10 DIAGNOSIS — M25.662 DECREASED RANGE OF MOTION (ROM) OF LEFT KNEE: ICD-10-CM

## 2023-03-10 DIAGNOSIS — R29.898 DECREASED STRENGTH OF LOWER EXTREMITY: ICD-10-CM

## 2023-03-10 PROCEDURE — 97161 PT EVAL LOW COMPLEX 20 MIN: CPT | Mod: PN

## 2023-03-10 PROCEDURE — 97110 THERAPEUTIC EXERCISES: CPT | Mod: PN

## 2023-03-12 PROBLEM — R29.898 DECREASED STRENGTH OF LOWER EXTREMITY: Status: ACTIVE | Noted: 2023-03-12

## 2023-03-12 PROBLEM — M25.662 DECREASED RANGE OF MOTION (ROM) OF LEFT KNEE: Status: ACTIVE | Noted: 2023-03-12

## 2023-03-12 NOTE — PLAN OF CARE
[facility]   Physical Therapy Initial Evaluation       Name: Serene Davis  Clinic Number: [id number]    Therapy Diagnosis:   Encounter Diagnoses   Name Primary?    Chondromalacia of knee Yes    Decreased strength of lower extremity     Decreased range of motion (ROM) of left knee         Physician: Bob Martines MD    Physician Orders: PT Eval and Treat   Medical Diagnosis from Referral: M94.269 (ICD-10-CM) - Chondromalacia of knee  Evaluation Date: 3/10/2023  Authorization Period Expiration: 2/9/2024  Plan of Care Expiration: 5/19/2023  Progress Note Due: 4/10/2023  Visit # / Visits authorized: 1/ 1   FOTO: 0/3  DOS: 2/15/2023      Precautions: Standard     Time In: 8:05  Time Out: 8:58  Total Appointment Time (timed & untimed codes): 53 minutes      SUBJECTIVE   Left knee arthroscopic chondroplasty lateral femoral condyle with microfracture  Left knee arthroscopic lateral meniscus debridement  Left knee arthroscopic lateral release    Pt is WB as tolerated    Time since surgery: 3 weeks 2 days    Date of surgery: 2/15/2023    History of current condition - Serene reports: She had knee surgery on 2/15/2023. She had previously been in physical therapy for her knee but conservative treatment was not successful. She is still currently in a lot of pain and having difficulty performing ADL's such as walking long distances, or getting out of a chair. 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 She notices she still has some swelling in her knee and that she cannot fully straighten her knee. She denies numbness or tingling in her knee.     Falls: Pt feel on her at work    Imaging: MRI studies: Impression:     Findings as detailed above including mild osseous degenerative changes, chondromalacia patella.  Small joint effusion.  Findings suggesting small intrasubstance tear or tendinopathy of the lateral collateral ligament and small tear mid body of  the lateral meniscus    Prior Therapy: Yes  Social History:  lives with their spouse, 7 steps  Occupation: CNA  Prior Level of Function: Pt was fully independent and pain free with ADL's  Current Level of Function: Pt now has pain with ADL's and walking     Pain:  Current 7/10, worst 8/10, best 3/10   Location: left knee    Description: Aching, Throbbing, and Deep  Aggravating Factors: Standing, Walking, and Getting out of bed/chair  Easing Factors: rest    Patients goals: Pt would like to be pain free with ADL's         Medical History:   Past Medical History:   Diagnosis Date    Graves disease        Surgical History:   Serene Davis  has a past surgical history that includes Hysterectomy; Shoulder surgery; and Knee arthroscopy w/ meniscectomy (Left, 2/15/2023).    Medications:   Serene has a current medication list which includes the following prescription(s): aspirin, etodolac, gabapentin, ibuprofen, levothyroxine, ondansetron, and oxycodone-acetaminophen, and the following Facility-Administered Medications: diphenhydramine, electrolyte-s (ph 7.4), fentanyl, hydromorphone (pf), ondansetron, and prochlorperazine.    Allergies:   Review of patient's allergies indicates:   Allergen Reactions    Adhesive Rash          OBJECTIVE       Gait: antalgic, left knee flexion with decreased weightbearing on left lower extremity, [place] gait    Stairs: Painful, right lower extremity leading    TUs    CST: 4 with UE support, Pain stopped test at 20      Hip Right Right Left Left    PROM MMT PROM MMT   Flexion 90 5/5 90 3+/5      Knee Right Right Right Left Left Left    AROM PROM MMT AROM PROM MMT   Flexion 130 130 5/5 90 92 4-/5   Extension 0 0 5/5 -30 -10 3/5     SLR: Unable     Quad set: poor    Limitation/Restriction for FOTO Knee Survey    Therapist reviewed FOTO scores for Serene Davis on 3/10/2023.   FOTO documents entered into EP[employer] - see Media section.    Limitation Score: 64%          TREATMENT     Total Treatment time (time-based codes) separate from Evaluation: 25 minutes      Serene received the treatments listed below:      therapeutic exercises to develop strength, endurance, ROM, and flexibility for 8 minutes including:    Quad Sets 10x5s holds  Patient education on HEP      manual therapy techniques: Joint mobilizations were applied to the: Left knee for 17 minutes, including:  Anterior glide grade IV  Hinge glide for extension Grade III      PATIENT EDUCATION AND HOME EXERCISES     Education provided:   - HEP  - Role of PT  - Goals of PT    Written Home Exercises Provided: yes. Exercises were reviewed and Serene was able to demonstrate them prior to the end of the session.  Serene demonstrated fair  understanding of the education provided. See EMR under Patient Instructions for exercises provided during therapy sessions.    ASSESSMENT     Serene is a 52 y.o. female referred to outpatient Physical Therapy with a medical diagnosis of M94.269 (ICD-10-CM) - Chondromalacia of knee. Patient presents with decreased functional mobility, decreased lower extremity strength, decreased knee range of motion, and decreased neuromuscular control of quadriceps. Pt received treatment with emphasis on gaining greater knee extension. Pt is guarded with knee extension, but is lacking significant knee extension at 3 weeks post op. Pt was educated on the importance of regaining knee extension.     Patient prognosis is Good.   Patient will benefit from skilled outpatient Physical Therapy to address the deficits stated above and in the chart below, provide patient /family education, and to maximize patientt's level of independence.     Plan of care discussed with patient: Yes  Patient's spiritual, cultural and educational needs considered and patient is agreeable to the plan of care and goals as stated below:     Anticipated Barriers for therapy: Chronic symptoms    Medical Necessity is demonstrated by  the following  History  Co-morbidities and personal factors that may impact the plan of care Co-morbidities:   Graves disease    Personal Factors:   no deficits     low   Examination  Body Structures and Functions, activity limitations and participation restrictions that may impact the plan of care Body Regions:   lower extremities    Body Systems:    gross symmetry  ROM  strength  gross coordinated movement  balance  gait  motor control    Participation Restrictions:   Walking    Activity limitations:   Learning and applying knowledge  no deficits    General Tasks and Commands  no deficits    Communication  no deficits    Mobility  lifting and carrying objects  walking    Self care  no deficits    Domestic Life  shopping  doing house work (cleaning house, washing dishes, laundry)  assisting others    Interactions/Relationships  no deficits    Life Areas  no deficits    Community and Social Life  community life  recreation and leisure         low   Clinical Presentation stable and uncomplicated low   Decision Making/ Complexity Score: low     Goals:    Short Term Goals: 5 weeks    1. Pt will be independent with HEP supplement PT in improving functional mobility.  2. Pt will improve L LE strength to at least 4/5 in order to improve functional mobility  3. Pt will improve L knee AROM to at least -5-100 in order to improve gait    Long Term Goals: 10 weeks  1. Pt will be independent with updated HEP supplement PT in improving functional mobility.  2. Pt will improve L LE strength to at least 4+/5 in order to improve functional mobility  3. Pt will improve L knee AROM to at least 0-120 in order to improve gait and ability to perform ADLs  4. Pt will improve FOTO knee survey score to </= 37% limited in order to demo improved functional mobility  5. Pt will perform TUG in < 10 seconds without AD in order to demo improved gait speed  6. Pt will perform at least 12 sit to stands without UE support on 30 second sit to stand  test in order to demo improved ability to perform transfers       PLAN   Plan of care Certification: 3/10/2023 to 5/19/2023.    Outpatient Physical Therapy 1-2 times weekly for 10 weeks to include the following interventions: Electrical Stimulation Russian, Gait Training, Manual Therapy, Neuromuscular Re-ed, Patient Education, Therapeutic Activities, and Therapeutic Exercise.     Bobo Diaz, PT, DPT      I CERTIFY THE NEED FOR THESE SERVICES FURNISHED UNDER THIS PLAN OF TREATMENT AND WHILE UNDER MY CARE   Physician's comments:     Physician's Signature: ___________________________________________________

## 2023-03-14 ENCOUNTER — CLINICAL SUPPORT (OUTPATIENT)
Dept: REHABILITATION | Facility: HOSPITAL | Age: 53
End: 2023-03-14
Payer: COMMERCIAL

## 2023-03-14 DIAGNOSIS — R29.898 DECREASED STRENGTH OF LOWER EXTREMITY: Primary | ICD-10-CM

## 2023-03-14 DIAGNOSIS — M25.662 DECREASED RANGE OF MOTION (ROM) OF LEFT KNEE: ICD-10-CM

## 2023-03-14 PROCEDURE — 97140 MANUAL THERAPY 1/> REGIONS: CPT | Mod: PN

## 2023-03-14 PROCEDURE — 97110 THERAPEUTIC EXERCISES: CPT | Mod: PN

## 2023-03-14 PROCEDURE — 97112 NEUROMUSCULAR REEDUCATION: CPT | Mod: PN

## 2023-03-14 NOTE — PROGRESS NOTES
[facility]   Physical Therapy Treatment Note     Name: Serene Davis  Clinic Number: [id number]    Therapy Diagnosis:   Encounter Diagnoses   Name Primary?    Decreased strength of lower extremity Yes    Decreased range of motion (ROM) of left knee      Physician: Bob Martines MD    Visit Date: 3/14/2023    Physician Orders: PT Eval and Treat   Medical Diagnosis from Referral: M94.269 (ICD-10-CM) - Chondromalacia of knee  Evaluation Date: 3/10/2023  Authorization Period Expiration: 2/9/2024  Plan of Care Expiration: 5/19/2023  Progress Note Due: 4/10/2023  Visit # / Visits authorized: 1/ 1   FOTO: 0/3  DOS: 2/15/2023        Precautions: Standard      Time In: 7:02  Time Out: 7:57  Total Appointment Time (timed & untimed codes): 55 minutes    FOTO 1st:   FOTO 3rd:  FOTO 10th:      SUBJECTIVE     Pt reports: Has been doing HEP but still in a lot of pain    She was compliant with home exercise program.  Response to previous treatment: Soreness  Functional change: Increased knee ROM    Pain: 6/10  Location: left knee      OBJECTIVE     Objective Measures updated at progress report unless specified.       Treatment       Serene received the treatments listed below:       therapeutic exercises to develop strength, endurance, ROM, and flexibility for 24 minutes including:       Nustep 5 min for ROM  LLLD 6min knee extension   Quad Sets 10x5s holds  LAQ 90-45 Degrees 2# 3x10  Patient education on HEP, and icing knee        manual therapy techniques: Joint mobilizations were applied to the: Left knee for 19 minutes, including:    Anterior glide grade IV  Hinge glide for extension Grade III  Fat Pad mobilization Grade II  Patellar mobilization all directions grade III      Therapeutic activities to improve functional performance for 0  minutes, including:      Neuromuscular re-education activities to improve: Coordination, Kinesthetic, and Sense for 12 minutes. The following  activities were included:     EOM Quad Sets [id number]  TKE 10x5s        Patient Education and Home Exercises     Home Exercises Provided and Patient Education Provided     Education provided:   - HEP    Written Home Exercises Provided: Patient instructed to cont prior HEP. Exercises were reviewed and Serene was able to demonstrate them prior to the end of the session.  Serene demonstrated good  understanding of the education provided. See EMR under Patient Instructions for exercises provided during therapy sessions    ASSESSMENT     Serene presented to physical with continued reports of knee pain. She had difficulty achieving full extension and with supine quad sets. She appears to have fat pad irritation and was shown how to perform mobilization at home. She was educated about use of ice for pain management. Plan to progress patient as tolerated.    Serene Is progressing well towards her goals.     Pt prognosis is Good.     Pt will continue to benefit from skilled outpatient physical therapy to address the deficits listed in the problem list box on initial evaluation, provide pt/family education and to maximize pt's level of independence in the home and community environment.     Pt's spiritual, cultural and educational needs considered and pt agreeable to plan of care and goals.     Anticipated Barriers for therapy: Chronic symptoms        Goals:     Short Term Goals: 5 weeks     1. Pt will be independent with HEP supplement PT in improving functional mobility.  2. Pt will improve L LE strength to at least 4/5 in order to improve functional mobility  3. Pt will improve L knee AROM to at least -5-100 in order to improve gait     Long Term Goals: 10 weeks  1. Pt will be independent with updated HEP supplement PT in improving functional mobility.  2. Pt will improve L LE strength to at least 4+/5 in order to improve functional mobility  3. Pt will improve L knee AROM to at least 0-120 in order to improve gait and  ability to perform ADLs  4. Pt will improve FOTO knee survey score to </= 37% limited in order to demo improved functional mobility  5. Pt will perform TUG in < 10 seconds without AD in order to demo improved gait speed  6. Pt will perform at least 12 sit to stands without UE support on 30 second sit to stand test in order to demo improved ability to perform transfers         PLAN   Plan of care Certification: 3/10/2023 to 5/19/2023.    Continue with physical therapy plan of care with emphasis on knee range of motion    Bobo Diaz, PT, DPT

## 2023-03-29 DIAGNOSIS — M22.42 CHONDROMALACIA OF LEFT PATELLA: Primary | ICD-10-CM

## 2023-03-29 DIAGNOSIS — S83.282A ACUTE LATERAL MENISCUS TEAR OF LEFT KNEE, INITIAL ENCOUNTER: ICD-10-CM

## 2023-03-30 ENCOUNTER — CLINICAL SUPPORT (OUTPATIENT)
Dept: REHABILITATION | Facility: HOSPITAL | Age: 53
End: 2023-03-30
Payer: COMMERCIAL

## 2023-03-30 DIAGNOSIS — M25.662 DECREASED RANGE OF MOTION (ROM) OF LEFT KNEE: ICD-10-CM

## 2023-03-30 DIAGNOSIS — R29.898 DECREASED STRENGTH OF LOWER EXTREMITY: Primary | ICD-10-CM

## 2023-03-30 PROCEDURE — 97110 THERAPEUTIC EXERCISES: CPT | Mod: PN

## 2023-03-30 PROCEDURE — 97140 MANUAL THERAPY 1/> REGIONS: CPT | Mod: PN

## 2023-03-30 PROCEDURE — 97112 NEUROMUSCULAR REEDUCATION: CPT | Mod: PN

## 2023-03-30 NOTE — PROGRESS NOTES
[facility]   Physical Therapy Treatment Note     Name: Serene Davis  [facility] Number: [id number]    Therapy Diagnosis:   Encounter Diagnoses   Name Primary?    Decreased strength of lower extremity Yes    Decreased range of motion (ROM) of left knee        Physician: Bob Martines MD    Visit Date: 3/30/2023    Physician Orders: PT Eval and Treat   Medical Diagnosis from Referral: M94.269 (ICD-10-CM) - Chondromalacia of knee  Evaluation Date: 3/10/2023  Authorization Period Expiration: 2/9/2024  Plan of Care Expiration: 5/19/2023  Progress Note Due: 4/10/2023  Visit # / Visits authorized: 3/ 12   FOTO: 0/3  DOS: 2/15/2023        Precautions: Standard      Time In: 8:06  Time Out: 9:00  Total Appointment Time (timed & untimed codes): 54 minutes    FOTO 1st:   FOTO 3rd:  FOTO 10th:      SUBJECTIVE     Pt reports: she has been sick and unable to come to therapy. Her knee continues to hurt her and cannot straighten her knee in supine    She was compliant with home exercise program.  Response to previous treatment: Soreness  Functional change: Increased knee ROM    Pain: 6/10  Location: left knee      OBJECTIVE     Objective Measures updated at progress report unless specified.        Knee Left     PROM   Flexion 95   Extension -10, -5 with overpressure      SLR: Unable      Quad set: poor    Treatment       Serene received the treatments listed below:       therapeutic exercises to develop strength, endurance, ROM, and flexibility for 21 minutes including:     Assessment as above  Nustep 5 min for ROM  Supine Heel slides LE elevation   LLLD 6min knee extension   Quad Sets 20x5s holds  LAQ 2# 3x10  Bridges 3x10  Patient education on HEP, and icing knee        manual therapy techniques: Joint mobilizations were applied to the: Left knee for 24 minutes, including:    Anterior glide grade IV  Hinge glide for extension Grade III  Fat Pad mobilization Grade II  Patellar mobilization  all directions grade III  EOM distraction with posterior and anterior glides Grade III      Therapeutic activities to improve functional performance for 0  minutes, including:      Neuromuscular re-education activities to improve: Coordination, Kinesthetic, and Sense for 9 minutes. The following activities were included:     EOM Quad Sets [id number]  TKE 10x5s    Patient Education and Home Exercises     Home Exercises Provided and Patient Education Provided     Education provided:   - HEP    Written Home Exercises Provided: Patient instructed to cont prior HEP. Exercises were reviewed and Serene was able to demonstrate them prior to the end of the session.  Serene demonstrated good  understanding of the education provided. See EMR under Patient Instructions for exercises provided during therapy sessions    ASSESSMENT     Serene continues to lack full knee range of motion. She does not tolerate Low load long duration stretches well as she frequently bends her knee. Pt's end feel for knee flexion and extension is empty as she does not allow passive full knee extension due to pain. She was able to increase knee range of motion after extensive manual therapy but continues to have deficits. She has greater knee extension in standing than in supine. Plan to progress patient as tolerated.    Serene Is progressing well towards her goals.     Pt prognosis is Good.     Pt will continue to benefit from skilled outpatient physical therapy to address the deficits listed in the problem list box on initial evaluation, provide pt/family education and to maximize pt's level of independence in the home and community environment.     Pt's spiritual, cultural and educational needs considered and pt agreeable to plan of care and goals.     Anticipated Barriers for therapy: Chronic symptoms        Goals:     Short Term Goals: 5 weeks     1. Pt will be independent with HEP supplement PT in improving functional mobility.  2. Pt will  improve L LE strength to at least 4/5 in order to improve functional mobility  3. Pt will improve L knee AROM to at least -5-100 in order to improve gait     Long Term Goals: 10 weeks  1. Pt will be independent with updated HEP supplement PT in improving functional mobility.  2. Pt will improve L LE strength to at least 4+/5 in order to improve functional mobility  3. Pt will improve L knee AROM to at least 0-120 in order to improve gait and ability to perform ADLs  4. Pt will improve FOTO knee survey score to </= 37% limited in order to demo improved functional mobility  5. Pt will perform TUG in < 10 seconds without AD in order to demo improved gait speed  6. Pt will perform at least 12 sit to stands without UE support on 30 second sit to stand test in order to demo improved ability to perform transfers         PLAN   Plan of care Certification: 3/10/2023 to 5/19/2023.    Continue with physical therapy plan of care with emphasis on knee range of motion    Bobo Diaz, PT, DPT

## 2023-03-31 ENCOUNTER — OFFICE VISIT (OUTPATIENT)
Dept: ORTHOPEDICS | Facility: CLINIC | Age: 53
End: 2023-03-31
Payer: COMMERCIAL

## 2023-03-31 ENCOUNTER — HOSPITAL ENCOUNTER (OUTPATIENT)
Dept: RADIOLOGY | Facility: HOSPITAL | Age: 53
Discharge: HOME | End: 2023-03-31
Attending: ORTHOPAEDIC SURGERY
Payer: COMMERCIAL

## 2023-03-31 VITALS — HEIGHT: 64 IN | BODY MASS INDEX: 38.58 KG/M2 | RESPIRATION RATE: 18 BRPM | WEIGHT: 226 LBS

## 2023-03-31 DIAGNOSIS — S83.282A ACUTE LATERAL MENISCUS TEAR OF LEFT KNEE, INITIAL ENCOUNTER: ICD-10-CM

## 2023-03-31 DIAGNOSIS — M22.42 CHONDROMALACIA OF LEFT PATELLA: ICD-10-CM

## 2023-03-31 DIAGNOSIS — M22.42 CHONDROMALACIA OF LEFT PATELLA: Primary | ICD-10-CM

## 2023-03-31 PROCEDURE — 73564 X-RAY EXAM KNEE 4 OR MORE: CPT | Mod: TC,PN,LT

## 2023-03-31 PROCEDURE — 73562 X-RAY EXAM OF KNEE 3: CPT | Mod: 26,RT,, | Performed by: RADIOLOGY

## 2023-03-31 PROCEDURE — 99024 POSTOP FOLLOW-UP VISIT: CPT | Mod: S$GLB,,, | Performed by: ORTHOPAEDIC SURGERY

## 2023-03-31 PROCEDURE — 73564 X-RAY EXAM KNEE 4 OR MORE: CPT | Mod: 26,LT,, | Performed by: RADIOLOGY

## 2023-03-31 PROCEDURE — 99999 PR PBB SHADOW E&M-EST. PATIENT-LVL I: CPT | Mod: PBBFAC,,, | Performed by: ORTHOPAEDIC SURGERY

## 2023-03-31 PROCEDURE — 99999 PR PBB SHADOW E&M-EST. PATIENT-LVL I: ICD-10-PCS | Mod: PBBFAC,,, | Performed by: ORTHOPAEDIC SURGERY

## 2023-03-31 PROCEDURE — 99024 PR POST-OP FOLLOW-UP VISIT: ICD-10-PCS | Mod: S$GLB,,, | Performed by: ORTHOPAEDIC SURGERY

## 2023-03-31 PROCEDURE — 73562 XR KNEE ORTHO LEFT WITH FLEXION: ICD-10-PCS | Mod: 26,RT,, | Performed by: RADIOLOGY

## 2023-03-31 PROCEDURE — 73564 XR KNEE ORTHO LEFT WITH FLEXION: ICD-10-PCS | Mod: 26,LT,, | Performed by: RADIOLOGY

## 2023-03-31 NOTE — PROGRESS NOTES
Post-op Note    HPI    Serene Davis is here 6 weeks s/p the following procedure:     Left knee arthroscopic chondroplasty lateral femoral condyle with microfracture  Left knee arthroscopic lateral meniscus debridement  Left knee arthroscopic lateral release    Overall doing well.  Walking much better.  Pain is 5/10.  She unfortunately was only able to make 2 physical therapy appointments due to being sick.      Physical Exam:     Patient is alert and oriented no acute distress.   Assistive Device:   None     Left knee Incision(s) are well healed.  There is no evidence of dehiscence.  There is no induration erythema or signs of infection.  Appropriate soft tissue swelling.   Mild joint effusion.Compartments are soft and compressible.  Warm well-perfused extremity.  Lateral joint line tenderness.  Range of motion 5-100        Assessment    Serene Davis is 6 weeks Post-op     Plan:    Overall doing as expected.  Unfortunately has had a little bit of a setback as she is not been able to progress with physical therapy as she had a recent illness.  We discussed home exercise program and continuation of physical therapy.  We discussed need for good range of motion and quadriceps and hamstring stretching and strengthening.  I will see her back in 6 weeks for re-evaluation, range-of-motion check

## 2023-04-06 ENCOUNTER — CLINICAL SUPPORT (OUTPATIENT)
Dept: REHABILITATION | Facility: HOSPITAL | Age: 53
End: 2023-04-06
Payer: COMMERCIAL

## 2023-04-06 DIAGNOSIS — M25.662 DECREASED RANGE OF MOTION (ROM) OF LEFT KNEE: ICD-10-CM

## 2023-04-06 DIAGNOSIS — R29.898 DECREASED STRENGTH OF LOWER EXTREMITY: Primary | ICD-10-CM

## 2023-04-06 PROCEDURE — 97110 THERAPEUTIC EXERCISES: CPT | Mod: PN

## 2023-04-06 PROCEDURE — 97140 MANUAL THERAPY 1/> REGIONS: CPT | Mod: PN

## 2023-04-06 PROCEDURE — 97112 NEUROMUSCULAR REEDUCATION: CPT | Mod: PN

## 2023-04-06 NOTE — PROGRESS NOTES
[facility]   Physical Therapy Treatment Note     Name: Serene Davis  Clinic Number: [id number]    Therapy Diagnosis:   Encounter Diagnoses   Name Primary?    Decreased strength of lower extremity Yes    Decreased range of motion (ROM) of left knee          Physician: Bob Martines MD    Visit Date: 4/6/2023    Physician Orders: PT Eval and Treat   Medical Diagnosis from Referral: M94.269 (ICD-10-CM) - Chondromalacia of knee  Evaluation Date: 3/10/2023  Authorization Period Expiration: 2/9/2024  Plan of Care Expiration: 5/19/2023  Progress Note Due: 4/10/2023  Visit # / Visits authorized: 4/ 12   FOTO: 0/3  DOS: 2/15/2023        Precautions: Standard      Time In: 800am  Time Out: 855am  Total Appointment Time (timed & untimed codes): 55 minutes    SUBJECTIVE     Pt reports: she has been working on it at home. Still having some trouble.     She was compliant with home exercise program.  Response to previous treatment: Soreness  Functional change: Increased knee ROM    Pain: 6/10  Location: left knee      OBJECTIVE     Objective Measures updated at progress report unless specified.        Knee Left     PROM   Flexion 105; empty   Extension 0 but painful along anterior knee       SLR: able but painful  Quad set: good    Treatment       Serene received the treatments listed below:       therapeutic exercises to develop strength, endurance, ROM, and flexibility for 20 minutes including:     Heel prop 2# above knee; 5 min  Standing SLR on EOM; 3 x 8   SL Hip Abd 2#   SL Shuttle 37# 4 x 10  DL Squat at 24 inch box 4 x 10   Seated Hip Abd 3 x 10 70#  Patient education on HEP / tape can get wet and stay on as long as tolerated / remove if any skin reaction develops        manual therapy techniques: Joint mobilizations were applied to the: Left knee for 20 minutes, including:    Fat Pad mobilization Grade II  Patellar mobilization - inferior and medial   Taping of patella      Therapeutic activities to improve functional performance for 0  minutes, including:      Neuromuscular re-education activities to improve: Coordination, Kinesthetic, and Sense for 15 minutes. The following activities were included:    SL Hip ER 4# 3 x 10   Step Ups with femoral stabilization 3 x 8  Quad sets with patella stabilization x 20    Patient Education and Home Exercises     Home Exercises Provided and Patient Education Provided     Education provided:   - HEP    Written Home Exercises Provided: Patient instructed to cont prior HEP. Exercises were reviewed and Serene was able to demonstrate them prior to the end of the session.  Serene demonstrated good  understanding of the education provided. See EMR under Patient Instructions for exercises provided during therapy sessions    ASSESSMENT     Serene presents with abnormal patellar tracking, increased lateral translation. With medial glide, she is able to achieve full extension without pain. Did some taping today to help off load the patella and she reported better tolerance to exercises. Pt informed to remove tape if any adverse effects arise. She reported no pain with step up when a femoral stabilization was provided to avoid IR. Will continue to focus on glute strength and NM re-education moving forward.     Serene Is progressing well towards her goals.     Pt prognosis is Good.     Pt will continue to benefit from skilled outpatient physical therapy to address the deficits listed in the problem list box on initial evaluation, provide pt/family education and to maximize pt's level of independence in the home and community environment.     Pt's spiritual, cultural and educational needs considered and pt agreeable to plan of care and goals.     Anticipated Barriers for therapy: Chronic symptoms        Goals:     Short Term Goals: 5 weeks     1. Pt will be independent with HEP supplement PT in improving functional mobility.  2. Pt will improve L LE  strength to at least 4/5 in order to improve functional mobility  3. Pt will improve L knee AROM to at least -5-100 in order to improve gait     Long Term Goals: 10 weeks  1. Pt will be independent with updated HEP supplement PT in improving functional mobility.  2. Pt will improve L LE strength to at least 4+/5 in order to improve functional mobility  3. Pt will improve L knee AROM to at least 0-120 in order to improve gait and ability to perform ADLs  4. Pt will improve FOTO knee survey score to </= 37% limited in order to demo improved functional mobility  5. Pt will perform TUG in < 10 seconds without AD in order to demo improved gait speed  6. Pt will perform at least 12 sit to stands without UE support on 30 second sit to stand test in order to demo improved ability to perform transfers         PLAN   Plan of care Certification: 3/10/2023 to 5/19/2023.    Continue with physical therapy plan of care with emphasis on knee range of motion    Smith Owusu, PT, DPT

## 2023-04-13 NOTE — PROGRESS NOTES
[facility]   Physical Therapy Treatment Note     Name: Serene [facility]  [facility] Number: [id number]    Therapy Diagnosis:   Encounter Diagnoses   Name Primary?    Decreased strength of lower extremity Yes    Decreased range of motion (ROM) of left knee        Physician: Bob Martines MD    Visit Date: 4/14/2023    Physician Orders: PT Eval and Treat   Medical Diagnosis from Referral: M94.269 (ICD-10-CM) - Chondromalacia of knee  Evaluation Date: 3/10/2023  Authorization Period Expiration: 2/9/2024  Plan of Care Expiration: 5/19/2023  Progress Note Due: 4/10/2023  Visit # / Visits authorized: 5/ 12   FOTO: 0/3  DOS: 2/15/2023        Precautions: Standard      Time In: 900am  Time Out: 950am  Total Appointment Time (timed & untimed codes): 50 minutes    SUBJECTIVE     Pt reports: she has been working on it at home. With walking; it remains painful in the front of her knee     She was compliant with home exercise program.  Response to previous treatment: Soreness  Functional change: Increased knee ROM    Pain: 6/10  Location: left knee      OBJECTIVE     Objective Measures updated at progress report unless specified.        Knee Left     PROM   Flexion 105; empty   Extension 0 but painful along anterior knee       SLR: able but painful  Quad set: good  Gait: toe out sign on the RLE     Treatment       Serene received the treatments listed below:       therapeutic exercises to develop strength, endurance, ROM, and flexibility for 25 minutes including:     NuStep 8 min; Level 3; LE only    SL Hip Abd 2# 4 x 10   DL Squat at 24 inch box 4 x 10   Seated Hip Abd 3 x 10 100#; 5sec holds   Patient education on HEP / tape can get wet and stay on as long as tolerated / remove if any skin reaction develops        manual therapy techniques: Joint mobilizations were applied to the: Left knee for 00 minutes, including:    Fat Pad mobilization Grade II  Patellar mobilization - inferior and  medial   Taping of patella     Therapeutic activities to improve functional performance for 0  minutes, including:      Neuromuscular re-education activities to improve: Coordination, Kinesthetic, and Sense for 25 minutes. The following activities were included:    SL Hip ER 3 x 30s   SL Hip IR-ER 2# 4 x 5; 3s eccentric   SL Clams 5 x 20s holds RTB   SL Shuttle 50# 4 x 10 with BTB around knees   Step Ups with femoral stabilization 2 x 8  Steps without femoral stab 3 x 5     Patient Education and Home Exercises     Home Exercises Provided and Patient Education Provided     Education provided:   - HEP    Written Home Exercises Provided: Patient instructed to cont prior HEP. Exercises were reviewed and Serene was able to demonstrate them prior to the end of the session.  Serene demonstrated good  understanding of the education provided. See EMR under Patient Instructions for exercises provided during therapy sessions    ASSESSMENT     Serene was able to demonstrate improved tolerance to hip control while performing step up. She continues to require verbal and tactile cueing to maintain her knee in neutral position when loaded. She was provided an updated HEP to improve her hip strength at home.     Serene Is progressing well towards her goals.     Pt prognosis is Good.     Pt will continue to benefit from skilled outpatient physical therapy to address the deficits listed in the problem list box on initial evaluation, provide pt/family education and to maximize pt's level of independence in the home and community environment.     Pt's spiritual, cultural and educational needs considered and pt agreeable to plan of care and goals.     Anticipated Barriers for therapy: Chronic symptoms        Goals:     Short Term Goals: 5 weeks     1. Pt will be independent with HEP supplement PT in improving functional mobility.  2. Pt will improve L LE strength to at least 4/5 in order to improve functional mobility  3. Pt will  improve L knee AROM to at least -5-100 in order to improve gait     Long Term Goals: 10 weeks  1. Pt will be independent with updated HEP supplement PT in improving functional mobility.  2. Pt will improve L LE strength to at least 4+/5 in order to improve functional mobility  3. Pt will improve L knee AROM to at least 0-120 in order to improve gait and ability to perform ADLs  4. Pt will improve FOTO knee survey score to </= 37% limited in order to demo improved functional mobility  5. Pt will perform TUG in < 10 seconds without AD in order to demo improved gait speed  6. Pt will perform at least 12 sit to stands without UE support on 30 second sit to stand test in order to demo improved ability to perform transfers         PLAN   Plan of care Certification: 3/10/2023 to 5/19/2023.    Continue with physical therapy plan of care with emphasis on knee range of motion    Smith Owusu, PT, DPT

## 2023-04-14 ENCOUNTER — CLINICAL SUPPORT (OUTPATIENT)
Dept: REHABILITATION | Facility: HOSPITAL | Age: 53
End: 2023-04-14
Payer: COMMERCIAL

## 2023-04-14 DIAGNOSIS — M25.662 DECREASED RANGE OF MOTION (ROM) OF LEFT KNEE: ICD-10-CM

## 2023-04-14 DIAGNOSIS — R29.898 DECREASED STRENGTH OF LOWER EXTREMITY: Primary | ICD-10-CM

## 2023-04-14 PROCEDURE — 97112 NEUROMUSCULAR REEDUCATION: CPT | Mod: PN

## 2023-04-14 PROCEDURE — 97110 THERAPEUTIC EXERCISES: CPT | Mod: PN

## 2023-04-18 ENCOUNTER — CLINICAL SUPPORT (OUTPATIENT)
Dept: REHABILITATION | Facility: HOSPITAL | Age: 53
End: 2023-04-18
Payer: COMMERCIAL

## 2023-04-18 DIAGNOSIS — R29.898 DECREASED STRENGTH OF LOWER EXTREMITY: Primary | ICD-10-CM

## 2023-04-18 DIAGNOSIS — M25.662 DECREASED RANGE OF MOTION (ROM) OF LEFT KNEE: ICD-10-CM

## 2023-04-18 PROCEDURE — 97110 THERAPEUTIC EXERCISES: CPT | Mod: PN

## 2023-04-18 PROCEDURE — 97140 MANUAL THERAPY 1/> REGIONS: CPT | Mod: PN

## 2023-04-18 PROCEDURE — 97112 NEUROMUSCULAR REEDUCATION: CPT | Mod: PN

## 2023-04-18 NOTE — PROGRESS NOTES
[facility]   Physical Therapy Treatment Note     Name: Serene Davis  Clinic Number: [id number]    Therapy Diagnosis:   Encounter Diagnoses   Name Primary?    Decreased strength of lower extremity Yes    Decreased range of motion (ROM) of left knee      Physician: Bob Martines MD    Visit Date: 4/18/2023    Physician Orders: PT Eval and Treat   Medical Diagnosis from Referral: M94.269 (ICD-10-CM) - Chondromalacia of knee  Evaluation Date: 3/10/2023  Authorization Period Expiration: 2/9/2024  Plan of Care Expiration: 5/19/2023  Progress Note Due: 4/10/2023  Visit # / Visits authorized: 6/12   FOTO: 0/3  DOS: 2/15/2023    Precautions: Standard      Time In: 800am  Time Out: 854am  Total Appointment Time (timed & untimed codes): 54 minutes    SUBJECTIVE     Pt reports: she was sore after last session. Feels better walking with leg straight.     She was compliant with home exercise program.  Response to previous treatment: Soreness  Functional change: Increased knee ROM    Pain: 3/10  Location: left knee      OBJECTIVE     Objective Measures updated at progress report unless specified.        Knee Left     PROM   Flexion 110; empty   Extension 0 but painful along anterior knee       SLR: able but painful  Quad set: good  Gait: toe out sign on the RLE     Treatment     Serene received the treatments listed below:       therapeutic exercises to develop strength, endurance, ROM, and flexibility for 24 minutes including:     Upright Bike 5 min; Level 3  Prone Quad stretch 3 x 30s   LAQ 10# 3 x 8   DL Squat at 24 inch box 4 x 10   Seated Hip Abd 3 x 10 100#; 5sec holds   Patient education on HEP      manual therapy techniques: Joint mobilizations were applied to the: Left knee for 10 minutes, including:    Fat Pad mobilization Grade II  Patellar mobilization - inferior and medial   MWM IR with knee flexion  Medial glides for knee flexion; grade 4 at multiple angles     Therapeutic  "activities to improve functional performance for 0  minutes, including:      Neuromuscular re-education activities to improve: Coordination, Kinesthetic, and Sense for 20 minutes. The following activities were included:    SL Hip ER 2# 3 x 12    DL Bridge with GTB around knees 4 x 5; 5s holds   SL Clams 5 x max hold GTB   SL Shuttle 50# 4 x 10 with BTB around knees   Steps without femoral stab 3 x 5     Patient Education and Home Exercises     Home Exercises Provided and Patient Education Provided     Education provided:   - HEP    Written Home Exercises Provided: Patient instructed to cont prior HEP. Exercises were reviewed and Serene was able to demonstrate them prior to the end of the session.  Serene demonstrated good  understanding of the education provided. See EMR under Patient Instructions for exercises provided during therapy sessions    ASSESSMENT     Serene was able to achieve 110 knee flexion today although still reporting "tightness" at end range. I spoke with her in depth that she needs to continue ti stretch at home and that stretching of her knee at this point is not going to be a comfortable process but she should not push past pain. Pt also reminded to continue to work on hip strength at home which is where we have made the most progress thus far. HEP provided.     Serene Is progressing well towards her goals.     Pt prognosis is Good.     Pt will continue to benefit from skilled outpatient physical therapy to address the deficits listed in the problem list box on initial evaluation, provide pt/family education and to maximize pt's level of independence in the home and community environment.     Pt's spiritual, cultural and educational needs considered and pt agreeable to plan of care and goals.     Anticipated Barriers for therapy: Chronic symptoms        Goals:     Short Term Goals: 5 weeks     1. Pt will be independent with HEP supplement PT in improving functional mobility.  2. Pt will " improve L LE strength to at least 4/5 in order to improve functional mobility  3. Pt will improve L knee AROM to at least -5-100 in order to improve gait     Long Term Goals: 10 weeks  1. Pt will be independent with updated HEP supplement PT in improving functional mobility.  2. Pt will improve L LE strength to at least 4+/5 in order to improve functional mobility  3. Pt will improve L knee AROM to at least 0-120 in order to improve gait and ability to perform ADLs  4. Pt will improve FOTO knee survey score to </= 37% limited in order to demo improved functional mobility  5. Pt will perform TUG in < 10 seconds without AD in order to demo improved gait speed  6. Pt will perform at least 12 sit to stands without UE support on 30 second sit to stand test in order to demo improved ability to perform transfers         PLAN   Plan of care Certification: 3/10/2023 to 5/19/2023.    Continue with physical therapy plan of care with emphasis on knee range of motion    Smith Owusu, PT, DPT

## 2023-04-28 ENCOUNTER — TELEPHONE (OUTPATIENT)
Dept: ORTHOPEDICS | Facility: CLINIC | Age: 53
End: 2023-04-28
Payer: MEDICAID

## 2023-04-28 DIAGNOSIS — S83.282A ACUTE LATERAL MENISCUS TEAR OF LEFT KNEE, INITIAL ENCOUNTER: Primary | ICD-10-CM

## 2023-04-28 DIAGNOSIS — M22.42 CHONDROMALACIA OF LEFT PATELLA: ICD-10-CM

## 2023-04-28 NOTE — TELEPHONE ENCOUNTER
----- Message from Lindy Perez MA sent at 4/28/2023  9:02 AM CDT -----  Contact: pt  Pt states she has run out of PT visits   [facility]   Call back  [phone]

## 2023-05-12 ENCOUNTER — TELEPHONE (OUTPATIENT)
Dept: ORTHOPEDICS | Facility: CLINIC | Age: 53
End: 2023-05-12
Payer: MEDICAID

## 2023-05-12 NOTE — TELEPHONE ENCOUNTER
Left message requesting pt call back to reschedule appt today due to provider book out due to surgery.

## 2023-05-25 ENCOUNTER — TELEPHONE (OUTPATIENT)
Dept: ORTHOPEDICS | Facility: CLINIC | Age: 53
End: 2023-05-25
Payer: MEDICAID

## 2023-06-28 ENCOUNTER — TELEPHONE (OUTPATIENT)
Dept: ORTHOPEDICS | Facility: CLINIC | Age: 53
End: 2023-06-28
Payer: MEDICAID

## 2023-06-28 NOTE — TELEPHONE ENCOUNTER
----- Message from Casimiro Ramos sent at 6/28/2023 12:03 PM CDT -----  Regarding: medicaid pt want to [facility] appt, call pt   Contact: pt   medicaid pt want to [facility] appt, call pt

## 2023-07-03 ENCOUNTER — TELEPHONE (OUTPATIENT)
Dept: ORTHOPEDICS | Facility: CLINIC | Age: 53
End: 2023-07-03
Payer: MEDICAID

## 2023-07-03 NOTE — TELEPHONE ENCOUNTER
----- Message from Keagan Haque MA sent at 7/3/2023 10:44 AM CDT -----  Contact: patient  Patient stated she needs another scope.  Patient is worker's comp and has not been seen since March.  Patient insisted that office needs to reach out to worker's comp and work this out??    Call back number is [phone]

## 2023-07-07 ENCOUNTER — OFFICE VISIT (OUTPATIENT)
Dept: ORTHOPEDICS | Facility: CLINIC | Age: 53
End: 2023-07-07
Payer: COMMERCIAL

## 2023-07-07 VITALS — BODY MASS INDEX: 38.58 KG/M2 | RESPIRATION RATE: 18 BRPM | HEIGHT: 64 IN | WEIGHT: 226 LBS

## 2023-07-07 DIAGNOSIS — M22.42 CHONDROMALACIA OF LEFT PATELLA: Primary | ICD-10-CM

## 2023-07-07 DIAGNOSIS — M17.12 ARTHRITIS OF LEFT KNEE: ICD-10-CM

## 2023-07-07 PROCEDURE — 99999 PR PBB SHADOW E&M-EST. PATIENT-LVL II: CPT | Mod: PBBFAC,,, | Performed by: ORTHOPAEDIC SURGERY

## 2023-07-07 PROCEDURE — 99214 PR OFFICE/OUTPT VISIT, EST, LEVL IV, 30-39 MIN: ICD-10-PCS | Mod: S$GLB,,, | Performed by: ORTHOPAEDIC SURGERY

## 2023-07-07 PROCEDURE — 99999 PR PBB SHADOW E&M-EST. PATIENT-LVL II: ICD-10-PCS | Mod: PBBFAC,,, | Performed by: ORTHOPAEDIC SURGERY

## 2023-07-07 PROCEDURE — 99214 OFFICE O/P EST MOD 30 MIN: CPT | Mod: S$GLB,,, | Performed by: ORTHOPAEDIC SURGERY

## 2023-07-07 NOTE — PROGRESS NOTES
Patient ID: Serene Davis is a 53 y.o. female    Chief Complaint:   Chief Complaint   Patient presents with    Left Knee - Pain, Swelling       History of Present Illness:    Pleasant 53-year-old female here for evaluation of left knee pain.  She is about 4 months status post left knee chondroplasty and microfracture of the lateral femoral condyle by me.  Postoperatively has done okay but has persistent swelling of the left knee as well as some instability sensation.  Feels like her knee is tight with flexion and pain over the lateral joint line.  Finished physical therapy.    PAST MEDICAL HISTORY:   Past Medical History:   Diagnosis Date    Graves disease      PAST SURGICAL HISTORY:   Past Surgical History:   Procedure Laterality Date    HYSTERECTOMY      KNEE ARTHROSCOPY W/ MENISCECTOMY Left 2/15/2023    Procedure: ARTHROSCOPY, KNEE, WITH MENISCECTOMY;  Surgeon: Bob Martines MD;  Location: [facility];  Service: Orthopedics;  Laterality: Left;  Meniscle debridement, micro fracture, lateral release    SHOULDER SURGERY       FAMILY HISTORY: No family history on file.  SOCIAL HISTORY:   Social History     Occupational History    Not on file   Tobacco Use    Smoking status: Every Day     Packs/day: 0.25     Types: Cigarettes    Smokeless tobacco: Never   Substance and Sexual Activity    Alcohol use: Yes     Comment: SOCIALLY    Drug use: Never    Sexual activity: Yes     Partners: Male        MEDICATIONS:   Current Outpatient Medications:     aspirin (ECOTRIN) 81 MG EC tablet, Take 1 tablet (81 mg total) by mouth once daily. for 14 days, Disp: 14 tablet, Rfl: 0    etodolac (LODINE) 300 MG Cap, Take 1 capsule (300 mg total) by mouth 2 (two) times daily., Disp: 30 capsule, Rfl: 0    gabapentin (NEURONTIN) 800 MG tablet, Take 800 mg by mouth once daily., Disp: , Rfl:     ibuprofen (ADVIL,MOTRIN) 600 MG tablet, Take 1 tablet (600 mg total) by mouth 3 (three) times daily., Disp: 30 tablet, Rfl: 0    levothyroxine  (SYNTHROID) 100 MCG tablet, Take 100 mcg by mouth before breakfast., Disp: , Rfl:     ondansetron (ZOFRAN) 4 MG tablet, Take 1 tablet (4 mg total) by mouth every 6 (six) hours as needed for Nausea., Disp: 20 tablet, Rfl: 0    oxyCODONE-acetaminophen (PERCOCET) 5-325 mg per tablet, Take 1 tablet by mouth every 4 (four) hours as needed for Pain., Disp: 28 tablet, Rfl: 0  No current facility-administered medications for this visit.    Facility-Administered Medications Ordered in Other Visits:     diphenhydrAMINE injection 12.5 mg, 12.5 mg, Intravenous, Once PRN, Marin Sweet MD    electrolyte-S (ISOLYTE), , Intravenous, Continuous, Marin Sweet MD, Stopped at 02/15/23 1333    fentaNYL 50 mcg/mL injection 25 mcg, 25 mcg, Intravenous, Q5 Min PRN, Marin Sweet MD, 25 mcg at 02/15/23 1250    HYDROmorphone (PF) injection 0.2 mg, 0.2 mg, Intravenous, Q5 Min PRN, Marin Sweet MD    ondansetron injection 4 mg, 4 mg, Intravenous, Once PRN, Marin Sweet MD    prochlorperazine injection Soln 5 mg, 5 mg, Intravenous, Q30 Min PRN, Marin Sweet MD, 5 mg at 02/15/23 1245  ALLERGIES:   Review of patient's allergies indicates:   Allergen Reactions    Adhesive Rash         Physical Exam     Vitals:    07/07/23 1030   Resp: 18     Alert and oriented to person, place and time. No acute distress. Well-groomed, not ill appearing. Pupils round and reactive, normal respiratory effort, no audible wheezing.     GENERAL:  A well-developed, well-nourished 53 y.o. female who is alert and       oriented in no acute distress.      Gait: She  walks with a normal gait.                   EXTREMITIES:  Examination of lower extremities reveals there is no visible mass or deformity.    Left knee:  ROM 5-120    Ligamentously stable to varus/valgus stress.    Anterior and posterior drawers negative.    No pain over pes bursa.    No warmth    No erythema     Effusion Yes    lateral joint line  tenderness    Negative Patellofemoral grind/crepitus         The skin over both lower extremities is normal and unremarkable.  She has a  painless range of motion of the hips and ankles bilaterally.   Sensation is intact in both lower extremities.    There are no motor deficits in the lower extremities bilaterally.   Pedal pulses are palpable distally bilaterally.    She has no calf tenderness to palpation nor edema.       Imaging:       X-Ray: I have reviewed all pertinent results/findings and my personal findings are:  Moderate DJD of the right knee and mild DJD of the left knee with lateral compartment spurring.  There is definite osteophyte formation with narrowing of the joint space, KLG2      Assessment & Plan    Chondromalacia of left patella  -     Prior authorization Order    Arthritis of left knee         53-year-old female status post left knee microfracture and debridement of the lateral femoral condyle from a work injury resulting in fairly large chondral defect of the lateral femoral condyle.  Unfortunately she is having persistent pain and instability in effusions of the left knee.  We discussed treatment options in depth including osteochondral allograft versus partial knee replacement versus attempted nonoperative treatment with lateral  brace and hyaluronic acid injections for pain relief.  She would like to try and avoid surgery if possible which is certainly reasonable.  We will get her fitted for a lateral  brace.  We will place referral for viscosupplementation injections.  She will follow up once approved.    We have discussed a variety of treatment options including medications, injections, physical therapy and other alternative treatments including surgery. I also explained the indications, risks and benefits of surgery as well as the morbidity and mortality associated with surgery.  Patient's pain is refractory HEP, conservative management, and NSAIDs. Pt would like to  proceed with visco-supplementation.    Medical Necessity for viscosupplementation use: After thorough evaluation of the patient, I have determined that viscosupplementation treatment is medically necessary. The patient has painful degenerative joint disease (DJD) of the knee(s) with failure of conservative treatments including lifestyle modifications and rehabilitation exercises. Oral analgesics including NSAIDs have not adequately controlled the patient's symptoms. There is radiographic evidence of Kellgren-Daniel grade II (or greater) osteoarthritic (OA) changes, or if lack of radiographic evidence, there is arthroscopic or other evidence of chondrosis of the knee(s).     I made the decision to obtain old records of the patient including previous notes and imaging. I independently reviewed and interpreted lab results today as well as prior imaging.      1. Pre-authorization placed for Visco injections.  2. Ice compress to the affected area 2-3x a day for 15-20 minutes as needed for pain management.  3. NSAIDs twice daily PRN for pain management.   4. RTC to see me for viscosupplementation injections.  5. Lateral  brace    All of the patient's questions were answered and the patient will contact us if they have any questions or concerns in the interim.

## 2023-07-17 ENCOUNTER — TELEPHONE (OUTPATIENT)
Dept: ORTHOPEDICS | Facility: CLINIC | Age: 53
End: 2023-07-17
Payer: MEDICAID

## 2023-07-17 NOTE — TELEPHONE ENCOUNTER
----- Message from Lindy Perez MA sent at 7/17/2023  2:42 PM CDT -----  Contact: pt  Missed call back, procedure   Wants earlier procedure     Brace, is it in   Call back

## 2023-07-17 NOTE — TELEPHONE ENCOUNTER
Called and spoke to pt. Informed pt that the 7/28 appointment is actually the soonest and as far as the brace I informed her I would ask our DME person about this nieves morning due to her leaving for the rest of the day. Evan MARIE

## 2023-07-17 NOTE — TELEPHONE ENCOUNTER
----- Message from Jasmyne Sanchez sent at 7/17/2023 10:58 AM CDT -----  Contact: pt  Type: Needs Medical Advice  Who Called:  pt  Best Call Back Number: [phone]    Additional Information: Pt is calling the office asking to speak with a nurse.please call back and advise.

## 2023-07-28 ENCOUNTER — OFFICE VISIT (OUTPATIENT)
Dept: ORTHOPEDICS | Facility: CLINIC | Age: 53
End: 2023-07-28
Payer: COMMERCIAL

## 2023-07-28 VITALS — BODY MASS INDEX: 38.58 KG/M2 | WEIGHT: 226 LBS | RESPIRATION RATE: 18 BRPM | HEIGHT: 64 IN

## 2023-07-28 DIAGNOSIS — M17.12 ARTHRITIS OF LEFT KNEE: Primary | ICD-10-CM

## 2023-07-28 PROCEDURE — 20610 LARGE JOINT ASPIRATION/INJECTION: L KNEE: ICD-10-PCS | Mod: LT,S$GLB,, | Performed by: ORTHOPAEDIC SURGERY

## 2023-07-28 PROCEDURE — 99999 PR PBB SHADOW E&M-EST. PATIENT-LVL II: CPT | Mod: PBBFAC,,, | Performed by: ORTHOPAEDIC SURGERY

## 2023-07-28 PROCEDURE — 99499 UNLISTED E&M SERVICE: CPT | Mod: S$GLB,,, | Performed by: ORTHOPAEDIC SURGERY

## 2023-07-28 PROCEDURE — 20610 DRAIN/INJ JOINT/BURSA W/O US: CPT | Mod: LT,S$GLB,, | Performed by: ORTHOPAEDIC SURGERY

## 2023-07-28 PROCEDURE — 99999 PR PBB SHADOW E&M-EST. PATIENT-LVL II: ICD-10-PCS | Mod: PBBFAC,,, | Performed by: ORTHOPAEDIC SURGERY

## 2023-07-28 PROCEDURE — 99499 NO LOS: ICD-10-PCS | Mod: S$GLB,,, | Performed by: ORTHOPAEDIC SURGERY

## 2023-07-28 NOTE — PROCEDURES
Large Joint Aspiration/Injection: L knee    Date/Time: 7/28/2023 10:30 AM  Performed by: Bob Martines MD  Authorized by: Bob Martines MD     Consent Done?:  Yes (Verbal)  Indications:  Arthritis  Site marked: the procedure site was marked    Timeout: prior to procedure the correct patient, procedure, and site was verified      Local anesthesia used?: Yes    Local anesthetic:  Lidocaine spray    Details:  Needle Size:  22 G  Approach:  Anterolateral  Location:  Knee  Site:  L knee  Medications:  4 mL hyaluronate sodium, stabilized 88 mg/4 mL  Patient tolerance:  Patient tolerated the procedure well with no immediate complications

## 2023-07-28 NOTE — PROGRESS NOTES
Patient here for left knee Monovisc injection.  Overall doing well.  Well tolerated.  Follow up 2 months or so.

## 2023-10-06 ENCOUNTER — TELEPHONE (OUTPATIENT)
Dept: ORTHOPEDICS | Facility: CLINIC | Age: 53
End: 2023-10-06
Payer: MEDICAID

## 2023-10-06 NOTE — TELEPHONE ENCOUNTER
----- Message from Casimiro Ramos sent at 10/6/2023  8:50 AM CDT -----  Regarding: est [employer] pt wants appt, call pt   Contact: pt   est [employer] pt wants appt, call pt

## 2023-10-17 ENCOUNTER — OFFICE VISIT (OUTPATIENT)
Dept: ORTHOPEDICS | Facility: CLINIC | Age: 53
End: 2023-10-17
Payer: COMMERCIAL

## 2023-10-17 VITALS — HEIGHT: 64 IN | RESPIRATION RATE: 18 BRPM | BODY MASS INDEX: 38.58 KG/M2 | WEIGHT: 226 LBS

## 2023-10-17 DIAGNOSIS — S83.282A ACUTE LATERAL MENISCUS TEAR OF LEFT KNEE, INITIAL ENCOUNTER: Primary | ICD-10-CM

## 2023-10-17 DIAGNOSIS — M17.12 ARTHRITIS OF LEFT KNEE: Primary | ICD-10-CM

## 2023-10-17 PROCEDURE — 99214 PR OFFICE/OUTPT VISIT, EST, LEVL IV, 30-39 MIN: ICD-10-PCS | Mod: S$GLB,,, | Performed by: ORTHOPAEDIC SURGERY

## 2023-10-17 PROCEDURE — 99999 PR PBB SHADOW E&M-EST. PATIENT-LVL II: CPT | Mod: PBBFAC,,, | Performed by: ORTHOPAEDIC SURGERY

## 2023-10-17 PROCEDURE — 99999 PR PBB SHADOW E&M-EST. PATIENT-LVL II: ICD-10-PCS | Mod: PBBFAC,,, | Performed by: ORTHOPAEDIC SURGERY

## 2023-10-17 PROCEDURE — 99214 OFFICE O/P EST MOD 30 MIN: CPT | Mod: S$GLB,,, | Performed by: ORTHOPAEDIC SURGERY

## 2023-10-18 NOTE — PROGRESS NOTES
Patient ID: Serene Davis is a 53 y.o. female    Chief Complaint:   Chief Complaint   Patient presents with    Left Knee - Pain       History of Present Illness:    Pleasant 53-year-old female known by me status post left knee arthroscopy and chondroplasty with microfracture of the lateral femoral condyle for a large acute on chronic lesion.  Postoperatively continued to have pain and stiffness and we did a series of viscosupplementation.  She would well for this for about 3 or 4 months and then returns today with continued pain mostly in the lateral compartment.  She reports mechanical symptoms and now feeling of unsteadiness with some mechanical symptoms.  Here to discuss future treatment options.    PAST MEDICAL HISTORY:   Past Medical History:   Diagnosis Date    Graves disease      PAST SURGICAL HISTORY:   Past Surgical History:   Procedure Laterality Date    HYSTERECTOMY      KNEE ARTHROSCOPY W/ MENISCECTOMY Left 2/15/2023    Procedure: ARTHROSCOPY, KNEE, WITH MENISCECTOMY;  Surgeon: Bob Martines MD;  Location: [facility];  Service: Orthopedics;  Laterality: Left;  Meniscle debridement, micro fracture, lateral release    SHOULDER SURGERY       FAMILY HISTORY: No family history on file.  SOCIAL HISTORY:   Social History     Occupational History    Not on file   Tobacco Use    Smoking status: Every Day     Current packs/day: 0.25     Types: Cigarettes    Smokeless tobacco: Never   Substance and Sexual Activity    Alcohol use: Yes     Comment: SOCIALLY    Drug use: Never    Sexual activity: Yes     Partners: Male        MEDICATIONS:   Current Outpatient Medications:     aspirin (ECOTRIN) 81 MG EC tablet, Take 1 tablet (81 mg total) by mouth once daily. for 14 days, Disp: 14 tablet, Rfl: 0    etodolac (LODINE) 300 MG Cap, Take 1 capsule (300 mg total) by mouth 2 (two) times daily., Disp: 30 capsule, Rfl: 0    gabapentin (NEURONTIN) 800 MG tablet, Take 800 mg by mouth once daily., Disp: , Rfl:      ibuprofen (ADVIL,MOTRIN) 600 MG tablet, Take 1 tablet (600 mg total) by mouth 3 (three) times daily., Disp: 30 tablet, Rfl: 0    levothyroxine (SYNTHROID) 100 MCG tablet, Take 100 mcg by mouth before breakfast., Disp: , Rfl:     ondansetron (ZOFRAN) 4 MG tablet, Take 1 tablet (4 mg total) by mouth every 6 (six) hours as needed for Nausea., Disp: 20 tablet, Rfl: 0    oxyCODONE-acetaminophen (PERCOCET) 5-325 mg per tablet, Take 1 tablet by mouth every 4 (four) hours as needed for Pain., Disp: 28 tablet, Rfl: 0  No current facility-administered medications for this visit.    Facility-Administered Medications Ordered in Other Visits:     diphenhydrAMINE injection 12.5 mg, 12.5 mg, Intravenous, Once PRN, Marin Sweet MD    electrolyte-S (ISOLYTE), , Intravenous, Continuous, Marin Sweet MD, Stopped at 02/15/23 1333    fentaNYL 50 mcg/mL injection 25 mcg, 25 mcg, Intravenous, Q5 Min PRN, Marin Sweet MD, 25 mcg at 02/15/23 1250    HYDROmorphone (PF) injection 0.2 mg, 0.2 mg, Intravenous, Q5 Min PRN, Marin Sweet MD    ondansetron injection 4 mg, 4 mg, Intravenous, Once PRN, Marin Sweet MD    prochlorperazine injection Soln 5 mg, 5 mg, Intravenous, Q30 Min PRN, Marin Sweet MD, 5 mg at 02/15/23 1245  ALLERGIES:   Review of patient's allergies indicates:   Allergen Reactions    Adhesive Rash         Physical Exam     Vitals:    10/17/23 1422   Resp: 18     Alert and oriented to person, place and time. No acute distress. Well-groomed, not ill appearing. Pupils round and reactive, normal respiratory effort, no audible wheezing.     GENERAL:  A well-developed, well-nourished 53 y.o. female who is alert and       oriented in no acute distress.      Gait: She  walks with a antalgic gait.                   EXTREMITIES:  Examination of lower extremities reveals there is no visible mass or deformity.    Left knee:  ROM 5-125    Ligamentously stable to varus/valgus  stress.    Anterior and posterior drawers negative.    No pain over pes bursa.    No warmth    No erythema     Effusion Yes    lateral joint line tenderness    Negative Patellofemoral grind/crepitus       The skin over both lower extremities is normal and unremarkable.  She has a  painless range of motion of the hips and ankles bilaterally.   Sensation is intact in both lower extremities.    There are no motor deficits in the lower extremities bilaterally.   Pedal pulses are palpable distally bilaterally.    She has no calf tenderness to palpation nor edema.       Imaging:       X-Ray: I have reviewed all pertinent results/findings and my personal findings are:  Mild to moderate DJD of the left knee      Assessment & Plan    Arthritis of left knee       Treatment options were discussed with her in detail.  She unfortunately has continued pain despite arthroscopic chondroplasty and viscosupplementation.  Most of her pain continues to be lateral which is the area of her chondral disease fairly large.  We discussed treatment options including total knee replacement versus repeat MRI to evaluate the cartilage to see if she would be a candidate for a salvage type procedure including possible osteochondral allograft if the remainder of her knee cartilage looks intact.    Follow up for MRI results.  She will need standing left knee series upon return

## 2023-10-30 ENCOUNTER — TELEPHONE (OUTPATIENT)
Dept: ORTHOPEDICS | Facility: CLINIC | Age: 53
End: 2023-10-30
Payer: MEDICAID

## 2023-10-30 NOTE — TELEPHONE ENCOUNTER
Left message requesting call back from workers comp  Angela Fatuma @ [phone]. She is wanting to set up medical conference in regards to pt care and work status.

## 2023-10-31 ENCOUNTER — TELEPHONE (OUTPATIENT)
Dept: ORTHOPEDICS | Facility: CLINIC | Age: 53
End: 2023-10-31
Payer: MEDICAID

## 2023-10-31 NOTE — TELEPHONE ENCOUNTER
----- Message from Lindy Perez MA sent at 10/31/2023  8:37 AM CDT -----  Contact: jason nurse   Calling on listed pt   Regarding MRI    Fowarded  to [facility] for 1010 form  Call back

## 2023-11-01 NOTE — TELEPHONE ENCOUNTER
----- Message from Bj Mata LPN sent at 11/1/2023  3:04 PM CDT -----  The nurse  is asking if this patient should have any work restrictions based on the last exam.      Thankssarah

## 2023-11-07 ENCOUNTER — HOSPITAL ENCOUNTER (OUTPATIENT)
Dept: RADIOLOGY | Facility: HOSPITAL | Age: 53
Discharge: HOME | End: 2023-11-07
Attending: ORTHOPAEDIC SURGERY
Payer: COMMERCIAL

## 2023-11-07 DIAGNOSIS — S83.282A ACUTE LATERAL MENISCUS TEAR OF LEFT KNEE, INITIAL ENCOUNTER: ICD-10-CM

## 2023-11-07 PROCEDURE — 73721 MRI JNT OF LWR EXTRE W/O DYE: CPT | Mod: TC,LT

## 2023-11-07 PROCEDURE — 73721 MRI KNEE WITHOUT CONTRAST LEFT: ICD-10-PCS | Mod: 26,LT,, | Performed by: RADIOLOGY

## 2023-11-07 PROCEDURE — 73721 MRI JNT OF LWR EXTRE W/O DYE: CPT | Mod: 26,LT,, | Performed by: RADIOLOGY

## 2023-11-14 ENCOUNTER — OFFICE VISIT (OUTPATIENT)
Dept: ORTHOPEDICS | Facility: CLINIC | Age: 53
End: 2023-11-14
Payer: COMMERCIAL

## 2023-11-14 VITALS — RESPIRATION RATE: 16 BRPM | WEIGHT: 226 LBS | BODY MASS INDEX: 38.58 KG/M2 | HEIGHT: 64 IN

## 2023-11-14 DIAGNOSIS — M17.12 ARTHRITIS OF LEFT KNEE: ICD-10-CM

## 2023-11-14 DIAGNOSIS — S83.282A ACUTE LATERAL MENISCUS TEAR OF LEFT KNEE, INITIAL ENCOUNTER: Primary | ICD-10-CM

## 2023-11-14 PROCEDURE — 99214 PR OFFICE/OUTPT VISIT, EST, LEVL IV, 30-39 MIN: ICD-10-PCS | Mod: S$GLB,,, | Performed by: ORTHOPAEDIC SURGERY

## 2023-11-14 PROCEDURE — 99999 PR PBB SHADOW E&M-EST. PATIENT-LVL III: CPT | Mod: PBBFAC,,, | Performed by: ORTHOPAEDIC SURGERY

## 2023-11-14 PROCEDURE — 99214 OFFICE O/P EST MOD 30 MIN: CPT | Mod: S$GLB,,, | Performed by: ORTHOPAEDIC SURGERY

## 2023-11-14 PROCEDURE — 99999 PR PBB SHADOW E&M-EST. PATIENT-LVL III: ICD-10-PCS | Mod: PBBFAC,,, | Performed by: ORTHOPAEDIC SURGERY

## 2023-11-14 NOTE — PROGRESS NOTES
Patient ID: Serene Davis is a 53 y.o. female    Chief Complaint:   Chief Complaint   Patient presents with    Left Knee - Injury, Pain       History of Present Illness:    Pleasant 53-year-old female known by me status post left knee arthroscopy and chondroplasty with microfracture of the lateral femoral condyle for a large acute on chronic lesion.  Postoperatively continued to have pain and stiffness and we did a series of viscosupplementation.  She would well for this for about 3 or 4 months and then returns today with continued pain mostly in the lateral compartment.  She reports mechanical symptoms and now feeling of unsteadiness with some mechanical symptoms.  Here to discuss future treatment options.    ____________________________________________________________________    Interval history 11/14/2023 : Patient returns today for MRI follow-up of their left knee.  They report no changes since I saw them last.  Still having pain and instability mostly in the lateral compartment.    PAST MEDICAL HISTORY:   Past Medical History:   Diagnosis Date    Graves disease      PAST SURGICAL HISTORY:   Past Surgical History:   Procedure Laterality Date    HYSTERECTOMY      KNEE ARTHROSCOPY W/ MENISCECTOMY Left 2/15/2023    Procedure: ARTHROSCOPY, KNEE, WITH MENISCECTOMY;  Surgeon: Bob Martines MD;  [facility]  Service: Orthopedics;  Laterality: Left;  Meniscle debridement, micro fracture, lateral release    SHOULDER SURGERY       FAMILY HISTORY: No family history on file.  SOCIAL HISTORY:   Social History     Occupational History    Not on file   Tobacco Use    Smoking status: Every Day     Current packs/day: 0.25     Types: Cigarettes    Smokeless tobacco: Never   Substance and Sexual Activity    Alcohol use: Yes     Comment: SOCIALLY    Drug use: Never    Sexual activity: Yes     Partners: Male        MEDICATIONS:   Current Outpatient Medications:     etodolac (LODINE) 300 MG Cap, Take 1 capsule  (300 mg total) by mouth 2 (two) times daily., Disp: 30 capsule, Rfl: 0    gabapentin (NEURONTIN) 800 MG tablet, Take 800 mg by mouth once daily., Disp: , Rfl:     ibuprofen (ADVIL,MOTRIN) 600 MG tablet, Take 1 tablet (600 mg total) by mouth 3 (three) times daily., Disp: 30 tablet, Rfl: 0    levothyroxine (SYNTHROID) 100 MCG tablet, Take 100 mcg by mouth before breakfast., Disp: , Rfl:     ondansetron (ZOFRAN) 4 MG tablet, Take 1 tablet (4 mg total) by mouth every 6 (six) hours as needed for Nausea., Disp: 20 tablet, Rfl: 0    oxyCODONE-acetaminophen (PERCOCET) 5-325 mg per tablet, Take 1 tablet by mouth every 4 (four) hours as needed for Pain., Disp: 28 tablet, Rfl: 0    aspirin (ECOTRIN) 81 MG EC tablet, Take 1 tablet (81 mg total) by mouth once daily. for 14 days, Disp: 14 tablet, Rfl: 0  No current facility-administered medications for this visit.    Facility-Administered Medications Ordered in Other Visits:     diphenhydrAMINE injection 12.5 mg, 12.5 mg, Intravenous, Once PRN, Marin Sweet MD    electrolyte-S (ISOLYTE), , Intravenous, Continuous, Marin Sweet MD, Stopped at 02/15/23 1333    fentaNYL 50 mcg/mL injection 25 mcg, 25 mcg, Intravenous, Q5 Min PRN, Marin Sweet MD, 25 mcg at 02/15/23 1250    HYDROmorphone (PF) injection 0.2 mg, 0.2 mg, Intravenous, Q5 Min PRN, Marin Sweet MD    ondansetron injection 4 mg, 4 mg, Intravenous, Once PRN, Marin Sweet MD    prochlorperazine injection Soln 5 mg, 5 mg, Intravenous, Q30 Min PRN, Marin Sweet MD, 5 mg at 02/15/23 1245  ALLERGIES:   Review of patient's allergies indicates:   Allergen Reactions    Adhesive Rash         Physical Exam     Vitals:    11/14/23 1359   Resp: 16     Alert and oriented to person, place and time. No acute distress. Well-groomed, not ill appearing. Pupils round and reactive, normal respiratory effort, no audible wheezing.     GENERAL:  A well-developed, well-nourished 53 y.o. female  who is alert and       oriented in no acute distress.      Gait: She  walks with a antalgic gait.                   EXTREMITIES:  Examination of lower extremities reveals there is no visible mass or deformity.    Left knee:  ROM 5-125    Ligamentously stable to varus/valgus stress.    Anterior and posterior drawers negative.    No pain over pes bursa.    No warmth    No erythema     Effusion Yes    lateral joint line tenderness    Negative Patellofemoral grind/crepitus       The skin over both lower extremities is normal and unremarkable.  She has a  painless range of motion of the hips and ankles bilaterally.   Sensation is intact in both lower extremities.    There are no motor deficits in the lower extremities bilaterally.   Pedal pulses are palpable distally bilaterally.    She has no calf tenderness to palpation nor edema.       Imaging:       X-Ray: I have reviewed all pertinent results/findings and my personal findings are:  Mild to moderate DJD of the left knee    MRI of the left knee reviewed showing full-thickness cartilage loss in the lateral compartment and moderate in the patellofemoral compartment.  There is subchondral cystic changes in the weight-bearing portion of the lateral femoral condyle.    Assessment & Plan    Acute lateral meniscus tear of left knee, initial encounter    Arthritis of left knee         Treatment options were discussed with her in detail.  She unfortunately has continued pain despite arthroscopic chondroplasty and viscosupplementation.  Most of her pain continues to be lateral which is the area of her chondral disease fairly large.  MRI does show continuation and fissuring have the lateral femoral condyle with subchondral cystic changes in the weight-bearing portion.  We discussed treatment options for this including osteochondral allograft transplantation as well as partial and total knee replacement.  We discussed the pros and cons of each in detail.  Ultimately I think that  a partial knee replacement and total knee replacement is her best option to give her the outcome she is looking for.  She has difficulty with activities of daily living and wants to get back to work and normal activities without pain as soon as possible.  I do think a osteochondral allograft would not give her the overall response she is looking for.  Have given her information regarding partial knee replacement.  I did discuss that personally replacement would not address her patellofemoral disease.  She understands and would like to think about this and let us know how she wants to proceed.  In the meantime she needs to continue to be light duty with limitations in her lifting and squatting.

## (undated) DEVICE — MAT QUICK 40X30 FLOOR FLUID LF

## (undated) DEVICE — SPONGE BULKEE II ABSRB 6X6.75

## (undated) DEVICE — DRESSING XEROFORM FOIL PK 1X8

## (undated) DEVICE — SYR LUER LOCK STERILE 10ML

## (undated) DEVICE — GLOVE SURG ULTRA TOUCH 7.5

## (undated) DEVICE — DRAPE U SPLIT SHEET 54X76IN

## (undated) DEVICE — DRAPE STERI INSTRUMENT 1018

## (undated) DEVICE — PAD CAST SPECIALIST STRL 6

## (undated) DEVICE — COVER SURG LIGHT HANDLE

## (undated) DEVICE — SUT ETHILON 3-0 PS2 18 BLK

## (undated) DEVICE — APPLICATOR CHLORAPREP ORN 26ML

## (undated) DEVICE — SEE MEDLINE ITEM 157216

## (undated) DEVICE — PACK SET UP 190 OMC-NS

## (undated) DEVICE — BNDG COFLEX FOAM LF2 ST 6X5YD

## (undated) DEVICE — SLEEVE SCD EXPRESS KNEE MEDIUM

## (undated) DEVICE — DRAPE THREE-QTR REINF 53X77IN

## (undated) DEVICE — BANDAGE MATRIX HK LOOP 6IN 5YD

## (undated) DEVICE — DRAPE EXTREMITY ORTHOMAX

## (undated) DEVICE — TOWEL OR DISP STRL BLUE 4/PK

## (undated) DEVICE — BLADE SURG CARBON STEEL SZ11

## (undated) DEVICE — MANIFOLD 4 PORT

## (undated) DEVICE — DRAPE STERI U-SHAPED 47X51IN

## (undated) DEVICE — NDL SAFETY 21G X 1 1/2 ECLPSE

## (undated) DEVICE — PROBE ARTHO ENERGY 90 DEG

## (undated) DEVICE — GOWN POLY REINF BRTH SLV XL

## (undated) DEVICE — BANDAGE ESMARK ELASTIC ST 6X9

## (undated) DEVICE — PADDING WYTEX UNDRCST 6INX4YD

## (undated) DEVICE — SEE MEDLINE ITEM 157171

## (undated) DEVICE — SOL IRR NACL .9% 3000ML

## (undated) DEVICE — PACK SIRUS BASIC V SURG STRL

## (undated) DEVICE — STRAP OR TABLE 5IN X 72IN

## (undated) DEVICE — NDL SPINAL 18GX3.5 SPINOCAN

## (undated) DEVICE — TOURNIQUET SB QC DP 34X4IN

## (undated) DEVICE — BLADE SHAVER LANZA 4.2X13CM

## (undated) DEVICE — UNDERGLOVES BIOGEL PI SIZE 8

## (undated) DEVICE — TUBING SUC UNIV W/CONN 12FT

## (undated) DEVICE — TUBE SET INFLOW/OUTFLOW